# Patient Record
Sex: FEMALE | Race: WHITE
[De-identification: names, ages, dates, MRNs, and addresses within clinical notes are randomized per-mention and may not be internally consistent; named-entity substitution may affect disease eponyms.]

---

## 2020-05-02 ENCOUNTER — HOSPITAL ENCOUNTER (EMERGENCY)
Dept: HOSPITAL 75 - ER | Age: 55
Discharge: ANOTHER HOSPITAL | End: 2020-05-02
Payer: MEDICAID

## 2020-05-02 VITALS — DIASTOLIC BLOOD PRESSURE: 88 MMHG | SYSTOLIC BLOOD PRESSURE: 140 MMHG

## 2020-05-02 VITALS — SYSTOLIC BLOOD PRESSURE: 137 MMHG | DIASTOLIC BLOOD PRESSURE: 79 MMHG

## 2020-05-02 VITALS — SYSTOLIC BLOOD PRESSURE: 137 MMHG | DIASTOLIC BLOOD PRESSURE: 88 MMHG

## 2020-05-02 VITALS — DIASTOLIC BLOOD PRESSURE: 83 MMHG | SYSTOLIC BLOOD PRESSURE: 136 MMHG

## 2020-05-02 VITALS — BODY MASS INDEX: 26.96 KG/M2 | WEIGHT: 159.84 LBS | HEIGHT: 64.57 IN

## 2020-05-02 DIAGNOSIS — M25.541: Primary | ICD-10-CM

## 2020-05-02 DIAGNOSIS — Z98.51: ICD-10-CM

## 2020-05-02 DIAGNOSIS — Z96.652: ICD-10-CM

## 2020-05-02 DIAGNOSIS — F17.200: ICD-10-CM

## 2020-05-02 DIAGNOSIS — R50.9: ICD-10-CM

## 2020-05-02 DIAGNOSIS — Z95.0: ICD-10-CM

## 2020-05-02 DIAGNOSIS — I42.9: ICD-10-CM

## 2020-05-02 DIAGNOSIS — Z88.8: ICD-10-CM

## 2020-05-02 DIAGNOSIS — F15.10: ICD-10-CM

## 2020-05-02 DIAGNOSIS — M25.542: ICD-10-CM

## 2020-05-02 DIAGNOSIS — I11.9: ICD-10-CM

## 2020-05-02 LAB
ALBUMIN SERPL-MCNC: 4.1 GM/DL (ref 3.2–4.5)
ALP SERPL-CCNC: 105 U/L (ref 40–136)
ALT SERPL-CCNC: 19 U/L (ref 0–55)
ANISOCYTOSIS BLD QL SMEAR: SLIGHT
APTT BLD: 36 SEC (ref 24–35)
APTT PPP: YELLOW S
BACTERIA #/AREA URNS HPF: (no result) /HPF
BARBITURATES UR QL: NEGATIVE
BASOPHILS # BLD AUTO: 0 10^3/UL (ref 0–0.1)
BASOPHILS NFR BLD AUTO: 0 % (ref 0–10)
BENZODIAZ UR QL SCN: POSITIVE
BILIRUB SERPL-MCNC: 0.5 MG/DL (ref 0.1–1)
BILIRUB UR QL STRIP: NEGATIVE
BUN/CREAT SERPL: 19
CALCIUM SERPL-MCNC: 9.9 MG/DL (ref 8.5–10.1)
CHLORIDE SERPL-SCNC: 97 MMOL/L (ref 98–107)
CO2 SERPL-SCNC: 19 MMOL/L (ref 21–32)
COCAINE UR QL: NEGATIVE
CREAT SERPL-MCNC: 0.84 MG/DL (ref 0.6–1.3)
EOSINOPHIL # BLD AUTO: 0 10^3/UL (ref 0–0.3)
EOSINOPHIL NFR BLD AUTO: 0 % (ref 0–10)
ERYTHROCYTE [DISTWIDTH] IN BLOOD BY AUTOMATED COUNT: 14 % (ref 10–14.5)
ERYTHROCYTE [SEDIMENTATION RATE] IN BLOOD: 133 MM/HR (ref 0–30)
FIBRINOGEN PPP-MCNC: CLEAR MG/DL
GFR SERPLBLD BASED ON 1.73 SQ M-ARVRAT: > 60 ML/MIN
GLUCOSE SERPL-MCNC: 166 MG/DL (ref 70–105)
GLUCOSE UR STRIP-MCNC: NEGATIVE MG/DL
HCT VFR BLD CALC: 32 % (ref 35–52)
HGB BLD-MCNC: 10.6 G/DL (ref 11.5–16)
INR PPP: 1.1 (ref 0.8–1.4)
KETONES UR QL STRIP: NEGATIVE
LEUKOCYTE ESTERASE UR QL STRIP: (no result)
LYMPHOCYTES # BLD AUTO: 0.8 X 10^3 (ref 1–4)
LYMPHOCYTES NFR BLD AUTO: 6 % (ref 12–44)
MANUAL DIFFERENTIAL PERFORMED BLD QL: YES
MCH RBC QN AUTO: 29 PG (ref 25–34)
MCHC RBC AUTO-ENTMCNC: 33 G/DL (ref 32–36)
MCV RBC AUTO: 89 FL (ref 80–99)
METHADONE UR QL SCN: NEGATIVE
METHAMPHETAMINE SCREEN URINE S: POSITIVE
MONOCYTES # BLD AUTO: 0.9 X 10^3 (ref 0–1)
MONOCYTES NFR BLD AUTO: 7 % (ref 0–12)
MONOCYTES NFR BLD: 5 %
NEUTROPHILS # BLD AUTO: 11.7 X 10^3 (ref 1.8–7.8)
NEUTROPHILS NFR BLD AUTO: 88 % (ref 42–75)
NEUTS BAND NFR BLD MANUAL: 84 %
NEUTS BAND NFR BLD: 5 %
NITRITE UR QL STRIP: NEGATIVE
OPIATES UR QL SCN: POSITIVE
OXYCODONE UR QL: NEGATIVE
PH UR STRIP: 6 [PH] (ref 5–9)
PLATELET # BLD: 235 10^3/UL (ref 130–400)
PMV BLD AUTO: 10.3 FL (ref 7.4–10.4)
POLYCHROMASIA BLD QL SMEAR: SLIGHT
POTASSIUM SERPL-SCNC: 3.9 MMOL/L (ref 3.6–5)
PROPOXYPH UR QL: NEGATIVE
PROT SERPL-MCNC: 7.9 GM/DL (ref 6.4–8.2)
PROT UR QL STRIP: (no result)
PROTHROMBIN TIME: 14.2 SEC (ref 12.2–14.7)
RBC #/AREA URNS HPF: (no result) /HPF
SODIUM SERPL-SCNC: 130 MMOL/L (ref 135–145)
SP GR UR STRIP: 1.01 (ref 1.02–1.02)
TRICYCLICS UR QL SCN: POSITIVE
URATE SERPL-MCNC: 6.2 MG/DL (ref 2.6–7.2)
VARIANT LYMPHS NFR BLD MANUAL: 6 %
WBC # BLD AUTO: 13.4 10^3/UL (ref 4.3–11)
WBC #/AREA URNS HPF: (no result) /HPF

## 2020-05-02 PROCEDURE — 85027 COMPLETE CBC AUTOMATED: CPT

## 2020-05-02 PROCEDURE — 93005 ELECTROCARDIOGRAM TRACING: CPT

## 2020-05-02 PROCEDURE — 87040 BLOOD CULTURE FOR BACTERIA: CPT

## 2020-05-02 PROCEDURE — 86431 RHEUMATOID FACTOR QUANT: CPT

## 2020-05-02 PROCEDURE — 83880 ASSAY OF NATRIURETIC PEPTIDE: CPT

## 2020-05-02 PROCEDURE — 71045 X-RAY EXAM CHEST 1 VIEW: CPT

## 2020-05-02 PROCEDURE — 36415 COLL VENOUS BLD VENIPUNCTURE: CPT

## 2020-05-02 PROCEDURE — 85730 THROMBOPLASTIN TIME PARTIAL: CPT

## 2020-05-02 PROCEDURE — 80306 DRUG TEST PRSMV INSTRMNT: CPT

## 2020-05-02 PROCEDURE — 87088 URINE BACTERIA CULTURE: CPT

## 2020-05-02 PROCEDURE — 80053 COMPREHEN METABOLIC PANEL: CPT

## 2020-05-02 PROCEDURE — 93041 RHYTHM ECG TRACING: CPT

## 2020-05-02 PROCEDURE — 85610 PROTHROMBIN TIME: CPT

## 2020-05-02 PROCEDURE — 51702 INSERT TEMP BLADDER CATH: CPT

## 2020-05-02 PROCEDURE — 87186 SC STD MICRODIL/AGAR DIL: CPT

## 2020-05-02 PROCEDURE — 84145 PROCALCITONIN (PCT): CPT

## 2020-05-02 PROCEDURE — 85007 BL SMEAR W/DIFF WBC COUNT: CPT

## 2020-05-02 PROCEDURE — 86141 C-REACTIVE PROTEIN HS: CPT

## 2020-05-02 PROCEDURE — 83605 ASSAY OF LACTIC ACID: CPT

## 2020-05-02 PROCEDURE — 85652 RBC SED RATE AUTOMATED: CPT

## 2020-05-02 PROCEDURE — 81000 URINALYSIS NONAUTO W/SCOPE: CPT

## 2020-05-02 PROCEDURE — 84550 ASSAY OF BLOOD/URIC ACID: CPT

## 2020-05-02 RX ADMIN — VANCOMYCIN HYDROCHLORIDE SCH MLS/HR: 750 INJECTION, POWDER, LYOPHILIZED, FOR SOLUTION INTRAVENOUS at 06:04

## 2020-05-02 RX ADMIN — VANCOMYCIN HYDROCHLORIDE SCH MLS/HR: 750 INJECTION, POWDER, LYOPHILIZED, FOR SOLUTION INTRAVENOUS at 07:13

## 2020-05-02 NOTE — XMS REPORT
Continuity of Care Document

                             Created on: 2020



Aide Huang

External Reference #: [id number]

: 1965

Sex: Female



Demographics





                          Address                   [address]  [address] [place]  [address]

 

                          Home Phone                [phone] x

 

                          Preferred Language        Unknown

 

                          Marital Status            Unknown

 

                          Anabaptism Affiliation     Unknown

 

                          Race                      Unknown

 

                          Ethnic Group              Unknown





Author





                          Organization              Unknown

 

                          Address                   Unknown

 

                          Phone                     Unavailable



              



Allergies

      



There is no data.                  



Medications

      



There is no data.                  



Problems

      



There is no data.                  



Procedures

      



There is no data.                  



Results

      



                    Test                Result              Range        

 

                                        TSH - 10/30/19 09:44         

 

                    TSH                 2.32 mIU/L           NRG        



                



Encounters

      



                ACCT No.           Visit Date/Time           Discharge          

 Status         

             Pt. Type           Provider           Facility           Loc./Unit 

          

Complaint        

 

                [id number]           2020 11:25:57           2020 23:59:

59           CLS

                Outpatient           SELENA Almanza                           

          

                                                 

 

                [id number]           2019 17:01:30           2019 23:59:

59           CLS

                Outpatient           SELENA Almanza                           

          

                                                 

 

                [id number]           2019 16:53:43           2019 23:59:

59           CLS

                Outpatient           SELENA Almanza                           

          

                                                 

 

                [id number]           2019 12:36:19           2019 23:59:

59           CLS

                Outpatient           SELENA Almanza                           

          

                                                 

 

                [id number]           2018 14:22:43           2018 23:59:

59           CLS

                Outpatient           Greg Schwarz                       

          

                                                 

 

                [id number]           2014 11:15:39           2014 23:59:

59           CLS

                Outpatient           Porsche Amado                     

          

                                                 

 

                [id number]           2014 15:59:46           2014 23:59:

59           CLS

             Outpatient           Saleem Marroquin                                 

     

       

 

                [id number]           2014 10:50:40           2014 23:59:

59           CLS

             Outpatient           Saleem Marroquin                                 

     

       

 

                [id number]           2014 16:04:24           2014 23:59:

59           CLS

             Outpatient           Saleem Marroquin                                 

     

       

 

                [id number]           2014 16:34:18           2014 23:59:

59           CLS

                Outpatient           Geni Fox                            

          

                                                 

 

                [id number]           10/25/2019 08:45:00           10/25/2019 23:59:

59           CLS

                Outpatient           JUDY HUSTON                           

[facility]                                 

 

             [id number]           10/30/2019 08:40:00                              

       Document

Registration

## 2020-05-02 NOTE — XMS REPORT
[facility]

                             Created on: 2017



Aide Huang

External Reference #: [id number]

: 1965

Sex: Female



Demographics





                          Address                   [address]  [address]

 

                          Preferred Language        Unknown

 

                          Marital Status            Unknown

 

                          Anglican Affiliation     Unknown

 

                          Race                      Unknown

 

                          Ethnic Group              Unknown





Author





                          Author                    Aide HUSTON

 

                          Organization              [facility]

 

                          Address                   [address] [place]  [address]



 

                          Phone                     [phone]







Care Team Providers





                    Care Team Member Name Role                Phone

 

                    JUDY HUSTON   Unavailable         [phone]







PROBLEMS





          Type      Condition ICD9-CM Code IEX28-VH Code Onset Dates Condition S

tatus SNOMED 

Code

 

          Problem   Primary osteoarthritis of both knees           M17.0        

       Active    [id number]

 

                Problem         Gastroesophageal reflux disease, esophagitis pre

sence not specified                 

K21.9                                   Active              [id number]

 

          Problem   Essential hypertension           I10                 Active 

   [id number]







ALLERGIES

No Known Allergies



SOCIAL HISTORY

Never Assessed



PLAN OF CARE





                          Activity                  Details

 

                                         

 

                          Follow Up                 1 Week Reason:after stress t

est







VITAL SIGNS





                    Height              65 in               2017

 

                    Weight              162.6 lbs           2017

 

                    Temperature         97.3 degrees Fahrenheit 2017

 

                    Heart Rate          96 bpm              2017

 

                    Respiratory Rate    20                  2017

 

                    Oximetry            99 %                2017

 

                    BMI                 27.06 kg/m2         2017

 

                    Blood pressure systolic 162 mmHg            2017

 

                    Blood pressure diastolic 99 mmHg             2017







MEDICATIONS





        Medication Instructions Dosage  Frequency Start Date End Date Duration S

tatus

 

        Excedrin Migraine 250-250-65 MG Orally Once a day 3 tabs  24h           

                  Active

 

             Metoprolol Tartrate 25 MG Orally Twice a day 1 tablet with food 12h

          

                                        30 day(s)           Active







RESULTS

No Results



PROCEDURES





                Procedure       Date Ordered    Result          Body Site

 

                EKG, TRACING (IN-HOUSE) 2017      N/A              

 

                MEASURE BLOOD OXYGEN LEVEL 2017                     

 

                ELECTROCARDIOGRAM, TRACING 2017                     







IMMUNIZATIONS

No Known Immunizations



MEDICAL (GENERAL) HISTORY





                    Type                Description         Date

 

                    Medical History     hypertension         

 

                    Medical History     Fluid on knees       

 

                    Surgical History    knee surgery Lt X 2  

 

                    Surgical History    tubal ligation       

 

                    Surgical History    lump removed Lt breast  

 

                    Surgical History    ovary removed Lt     

 

                    Surgical History    tonsillectomy and adenoidectomy  

 

                    Surgical History    tubes put in ears    

 

                    Surgical History    colonoscopy          

 

                    Surgical History    heart cath          2017

 

                    Hospitalization History surgeries            

 

                    Hospitalization History child birth

## 2020-05-02 NOTE — NUR
REPORT AND CARE OF PATIENT FROM [facility]. 

-------------------------------------------------------------------------------

Addendum: 05/02/20 at 0737 by KELSEY

-------------------------------------------------------------------------------

TO ROOM NO NEW C/O

## 2020-05-02 NOTE — ED GENERAL
General


Chief Complaint:  General Problems/Pain


Stated Complaint:  BOTH ARMS SWOLLEN,COPD,PACE MAKER


Nursing Triage Note:  


swelling throughout body, states it comes and goes. c/o pain and skin feels 


tight, states numbness and tingling.


Nursing Sepsis Screen:  No Definite Risk


Source of Information:  Patient


Exam Limitations:  No Limitations





History of Present Illness


Date Seen by Provider:  May 2, 2020


Time Seen by Provider:  01:34


Initial Comments


This 55-year-old woman presents to the emergency room with primary complaint of 

bilateral upper extremity swelling and paresthesias.  On assessment she is found

to be febrile which she was not aware of.  She complains of frequent problems 

with swelling and she is prescribed Lasix to take as needed.  She reports 

pacemaker placement one year ago due to cardiomyopathy.  She sees Dr. Almanza 

in [place] for her cardiac care.  Her primary care provider is Judy Jackson 

at the [facility] clinic in [place] her extremities are diffusely swollen, upper 

extremities worse than lower.  She also has significant arthralgia associated 

with this.  She has patchy erythema over the joints of the upper extremities.  

She admits to using methamphetamines by snorting within the past couple days.  

She reports being told by doctors previously she may have some type of arthritic

or rheumatologic problem.  She denies any cough, shortness of breath, exposure 

to ill persons, or contact with persons under investigation for COVID 19.  She 

has chronic incontinence.  Patient also had recent knee replacements, the right 

knee in December and the left knee the beginning of March.  Current exacerbation

of symptoms started .





Allergies and Home Medications


Allergies


Coded Allergies:  


     lisinopril (Verified  Allergy, Unknown, 20)





Patient Home Medication List


Home Medication List Reviewed:  Yes





Review of Systems


Review of Systems


Constitutional:  see HPI


EENTM:  no symptoms reported


Respiratory:  no symptoms reported


Cardiovascular:  see HPI


Gastrointestinal:  no symptoms reported


Genitourinary:  see HPI


Pregnant:  No


Musculoskeletal:  see HPI


Skin:  see HPI


Psychiatric/Neurological:  No Symptoms Reported


Hematologic/Lymphatic:  No Symptoms Reported


Immunological/Allergic:  see HPI





Past Medical-Social-Family Hx


Past Med/Social Hx:  Reviewed Nursing Past Med/Soc Hx


Patient Social History


Alcohol Use:  Denies Use


Recreational Drug Use:  Yes (LAST USED YESTERDAY)


Drug of Choice:  METH


Smoking Status:  Current Everyday Smoker


Recent Foreign Travel:  No


Contact w/Someone Who Travel:  No


Recent Infectious Disease Expo:  No


Recent Hopitalizations:  No


Physical Abuse:  No


Sexual Abuse:  No


Mistreated:  No


Fear:  No





Seasonal Allergies


Seasonal Allergies:  No





Past Medical History


Surgeries:  Yes


Joint Replacement (bilateral knees), Lumpectomy, Orthopedic, Pacemaker, Tubal 

Ligation


Respiratory:  No


Cardiac:  Yes (PACEMAKER)


Cardiomyopathy, Hypertension


Pregnant:  No


Hx :  3


Hx Para:  4


Hx Total # of Abortions (Sp):  1


GYN History:  Tubal Ligation


Genitourinary:  Yes (chronic incontinence)


Gastrointestinal:  No


Musculoskeletal:  Yes (KNEE REPLACEMENT)


Endocrine:  No


HEENT:  No


Cancer:  No


Psychosocial:  No


Integumentary:  No


Blood Disorders:  No





Physical Exam-Suspected Sepsis


Physical Exam


Vital Signs





Vital Signs - First Documented








 20





 05:53


 


O2 Delivery Nasal Cannula


 


O2 Flow Rate 2.00





Capillary Refill : Less Than 3 Seconds








Blood Pressure Mean:                    97








Height, Weight, BMI


Height: '"


Weight: lbs. oz. kg; 26.00 BMI


Method:


General Appearance:  WD/WN, Mild Distress


HEENT:  PERRL/EOMI, TMs Normal, Normal ENT Inspection, Pharynx Normal


Neck:  Normal Inspection


Respiratory:  Lungs Clear, Normal Breath Sounds, No Accessory Muscle Use, No 

Respiratory Distress


Cardiovascular:  No Murmur, Normal Peripheral Pulses, Tachycardia


Gastrointestinal:  Non Tender, Soft


Extremity:  Other (diffusely edematous extremities, upper extremities greater 

than lower and left knee greater than right.  There is patchy erythema over the 

upper extremity joints.  There is diffuse tenderness and pain with movement.  

Nodular lump on the right shoulder resembling lipoma.)


Neurologic/Psychiatric:  Alert, Oriented x3, No Motor/Sensory Deficits, Normal 

Mood/Affect, CNs II-XII Norm as Tested


Skin:  warm/dry, other (see above)





Focused Exam


Lactate Level


20 01:40: Lactic Acid Level 1.98





Lactic Acid Level








Progress/Results/Core Measures


Suspected Sepsis


Recent Fever Within 48 Hours:  No


Infection Criteria Present:  None


New/Unexplained  Altered Menta:  No


Sepsis Screen:  No Definite Risk


SIRS


Temperature: 


Pulse: 115 


Respiratory Rate: 20


 


Laboratory Tests


20 01:40: White Blood Count 13.4H


Blood Pressure 134 /79 


Mean: 97


 


20 01:40: Lactic Acid Level 1.98


Laboratory Tests


20 01:40: 


Creatinine 0.84, INR Comment 1.1, Platelet Count 235, Total Bilirubin 0.5








Results/Orders


Lab Results





Laboratory Tests








Test


 20


01:38 20


01:40 20


02:55 Range/Units


 


 


White Blood Count


 


 13.4 H


 


 4.3-11.0


10^3/uL


 


Red Blood Count


 


 3.63 L


 


 4.35-5.85


10^6/uL


 


Hemoglobin  10.6 L  11.5-16.0  G/DL


 


Hematocrit  32 L  35-52  %


 


Mean Corpuscular Volume  89   80-99  FL


 


Mean Corpuscular Hemoglobin  29   25-34  PG


 


Mean Corpuscular Hemoglobin


Concent 


 33 


 


 32-36  G/DL





 


Red Cell Distribution Width  14.0   10.0-14.5  %


 


Platelet Count


 


 235 


 


 130-400


10^3/uL


 


Mean Platelet Volume  10.3   7.4-10.4  FL


 


Neutrophils (%) (Auto)  88 H  42-75  %


 


Lymphocytes (%) (Auto)  6 L  12-44  %


 


Monocytes (%) (Auto)  7   0-12  %


 


Eosinophils (%) (Auto)  0   0-10  %


 


Basophils (%) (Auto)  0   0-10  %


 


Neutrophils # (Auto)  11.7 H  1.8-7.8  X 10^3


 


Lymphocytes # (Auto)  0.8 L  1.0-4.0  X 10^3


 


Monocytes # (Auto)  0.9   0.0-1.0  X 10^3


 


Eosinophils # (Auto)


 


 0.0 


 


 0.0-0.3


10^3/uL


 


Basophils # (Auto)


 


 0.0 


 


 0.0-0.1


10^3/uL


 


Neutrophils % (Manual)  84    %


 


Lymphocytes % (Manual)  6    %


 


Monocytes % (Manual)  5    %


 


Band Neutrophils  5    %


 


Polychromasia  SLIGHT    


 


Anisocytosis  SLIGHT    


 


Erythrocyte Sedimentation Rate  133 H  0-30  MM/HR


 


Prothrombin Time  14.2   12.2-14.7  SEC


 


INR Comment  1.1   0.8-1.4  


 


Activated Partial


Thromboplast Time 


 36 H


 


 24-35  SEC





 


Sodium Level  130 L  135-145  MMOL/L


 


Potassium Level  3.9   3.6-5.0  MMOL/L


 


Chloride Level  97 L    MMOL/L


 


Carbon Dioxide Level  19 L  21-32  MMOL/L


 


Anion Gap  14   5-14  MMOL/L


 


Blood Urea Nitrogen  16   7-18  MG/DL


 


Creatinine


 


 0.84 


 


 0.60-1.30


MG/DL


 


Estimat Glomerular Filtration


Rate 


 > 60 


 


  





 


BUN/Creatinine Ratio  19    


 


Glucose Level  166 H    MG/DL


 


Lactic Acid Level


 


 1.98 


 


 0.50-2.00


MMOL/L


 


Uric Acid  6.2   2.6-7.2  MG/DL


 


Calcium Level  9.9   8.5-10.1  MG/DL


 


Corrected Calcium  9.8   8.5-10.1  MG/DL


 


Total Bilirubin  0.5   0.1-1.0  MG/DL


 


Aspartate Amino Transf


(AST/SGOT) 


 18 


 


 5-34  U/L





 


Alanine Aminotransferase


(ALT/SGPT) 


 19 


 


 0-55  U/L





 


Alkaline Phosphatase  105     U/L


 


C-Reactive Protein High


Sensitivity 


 32.98 H


 


 0.00-0.50


MG/DL


 


B-Type Natriuretic Peptide  42.4   <100.0  PG/ML


 


Total Protein  7.9   6.4-8.2  GM/DL


 


Albumin  4.1   3.2-4.5  GM/DL


 


Procalcitonin  0.14 H  <0.10  NG/ML


 


Urine Color   YELLOW   


 


Urine Clarity   CLEAR   


 


Urine pH   6.0  5-9  


 


Urine Specific Gravity   1.015 L 1.016-1.022  


 


Urine Protein   TRACE H NEGATIVE  


 


Urine Glucose (UA)   NEGATIVE  NEGATIVE  


 


Urine Ketones   NEGATIVE  NEGATIVE  


 


Urine Nitrite   NEGATIVE  NEGATIVE  


 


Urine Bilirubin   NEGATIVE  NEGATIVE  


 


Urine Urobilinogen   0.2  < = 1.0  MG/DL


 


Urine Leukocyte Esterase   1+ H NEGATIVE  


 


Urine RBC (Auto)   2+ H NEGATIVE  


 


Urine RBC      /HPF


 


Urine WBC      /HPF


 


Urine Crystals      /LPF


 


Urine Bacteria      /HPF


 


Urine Casts      /LPF


 


Urine Mucus      /LPF


 


Urine Culture Indicated


 


 


 CULTURE


PENDING  





 


Urine Opiates Screen   POSITIVE H NEGATIVE  


 


Urine Oxycodone Screen   NEGATIVE  NEGATIVE  


 


Urine Methadone Screen   NEGATIVE  NEGATIVE  


 


Urine Propoxyphene Screen   NEGATIVE  NEGATIVE  


 


Urine Barbiturates Screen   NEGATIVE  NEGATIVE  


 


Ur Tricyclic Antidepressants


Screen 


 


 POSITIVE H


 NEGATIVE  





 


Urine Phencyclidine Screen   NEGATIVE  NEGATIVE  


 


Urine Amphetamines Screen   POSITIVE H NEGATIVE  


 


Urine Methamphetamines Screen   POSITIVE H NEGATIVE  


 


Urine Benzodiazepines Screen   POSITIVE H NEGATIVE  


 


Urine Cocaine Screen   NEGATIVE  NEGATIVE  


 


Urine Cannabinoids Screen   NEGATIVE  NEGATIVE  








Micro Results





Microbiology


20 Urine Culture - Preliminary, Resulted


         Escherichia coli


20 Blood Culture - Preliminary, Resulted


         No growth


20 Blood Culture - Preliminary, Resulted


         No growth





My Orders





Orders - PAULINE KELLER MD


Cbc With Automated Diff (20 01:42)


Comprehensive Metabolic Panel (20 01:42)


Blood Culture (20 01:42)


Urinalysis (20 01:42)


Urine Culture (20 01:42)


Protime With Inr (20 01:42)


Partial Thromboplastin Time (20 01:42)


Chest 1 View, Ap/Pa Only (20 01:42)


Ed Iv/Invasive Line Start (20 01:42)


Ed Iv/Invasive Line Start (20 01:42)


Vital Signs Adult Sepsis Patie Q15M (20 01:42)


O2 (20 01:42)


Remove Rings In Anticipation O (20 01:42)


Lactic Acid Analyzer (20 01:42)


Hs C Reactive Protein (20 01:42)


Erythrocyte Sedimentation Rate (20 01:42)


BNP (20 01:42)


Ekg Tracing (20 01:42)


Monitor-Rhythm Ecg Trace Only (20 01:42)


Drug Screen Stat (Urine) (20 01:47)


Manual Differential (20 01:40)


Uric Acid (20 01:40)


Ra Factor (Rheumatoid Factor) (20 02:43)


Procalcitonin (Pct) (20 02:46)


Acetaminophen  Tablet (Tylenol  Tablet) (20 03:00)


Lactated Ringers (Lr 1000 Ml Iv Solution (20 02:47)


Piperacillin Sodium/Tazobactam (Zosyn Vi (20 03:15)


Fentanyl  Injection (Sublimaze Injection (20 03:30)


Ketorolac Injection (Toradol Injection) (20 04:15)


Morphine  Injection (Morphine  Injection (20 04:22)


Vancomycin Injection (Vancomycin Injecti (20 05:30)





Medications Given in ED





Vital Signs/I&O











 20





 07:23 09:35


 


Pulse 103 90


 


Resp 18 18


 


B/P (MAP) 137/88 (104) 136/83


 


Pulse Ox 99 98


 


O2 Delivery Room Air Nasal Cannula


 


O2 Flow Rate  2.00





Capillary Refill : Less Than 3 Seconds








Blood Pressure Mean:                    97








Progress Note #1:  


   Time:  03:37


Progress Note


Septic workup is being pursued.  Blood cultures and lactic acid were obtained.  

Lactic acid was normal.  Sedimentation rate and CRP are markedly elevated.  

Given her clinical presentation and lab findings, I'm suspicious this may be a 

systemic rheumatologic problem or vasculitis.  No source of bacterial infection 

was found.  Zosyn was given empirically.


Progress Note #2:  


   Time:  03:59


Progress Note


Patient's pain had been escalating.  She received fentanyl which reduced her 

pain from 10 down to 6.  Toradol is being ordered for further pain control.  

Case was discussed with Dr. Kruger who is agreeable to admission.  However, she 

would like me to obtain a phone consultation with rheumatology before admission.

 Neither [facility] nor [facility] in [place] have rheumatology on call.  I'm awaiting a 

call back from [facility].


Progress Note #3:  


   Time:  05:36


Progress Note


I was able to consult with Dr. Bailey with [facility] rheumatology.  It is difficult for 

him to make a determination on etiology of her condition without actually 

examining the patient himself.  He of course stressed the importance of ruling 

out infectious etiologies.  Patient has received Zosyn empirically and will 

receive vancomycin.  However, I think infection is less likely than a 

rheumatologic problem.  She does not appear septic.  Patient reports she has had

an episode like this previously for which she sought care in the emergency room 

in [place] and was told she likely had arthritis.  Meningitis is unlikely as 

headache or nuchal rigidity is not presently a part of the presentation.  

Patient is also not lethargic.  She has other evidence of long-standing 

arthritis including need for knee replacements and changes to the joints of the 

hands.  Patient also reports her sister has rheumatoid arthritis.  She also has 

history of cardiomyopathy without notation of coronary artery disease.  Dr. Bailey 

recommended further workup with YOSEF, CCP, and x-rays of the joints.  I will 

allow those studies to be done at [facility] since I would not have immediate results 

in the ER anyway.  Patient's pain is now controlled after morphine.  Patient is 

agreeable to transfer to [facility].  Accepting physician is Dr. Cool on the 

internal medicine service.





ECG


Initial ECG Impression Date:  May 2, 2020


Initial ECG Impression Time:  01:41


Initial ECG Rate:  114


Comment


Atrial sensed ventricular paced rhythm with mild tachycardia.





Diagnostic Imaging





   Diagonstic Imaging:  Xray


   Plain Films/CT/US/NM/MRI:  chest


Comments


Chest x-ray viewed by me.  Report not yet available.  No acute abnormality 

appreciated.





Departure


Impression





   Primary Impression:  


   Polyarthralgia


   Additional Impressions:  


   Febrile illness


   Methamphetamine abuse


Disposition:   XFER SHT-TRM HOSP


Condition:  Improved





Transfer


Transfer Reason:  Exceeds level of care


Time Spoke to Accepting Phy:  05:07


Transfer Progress Notes


Accepting physician Dr. Cool


Transfer Time:  09:30


Transfer Facility:  


[facility]


Method of Transfer:  EMS





Departure-Patient Inst.


Referrals:  


JUDY HUSTON (PCP/Family)


Primary Care Physician











PAULINE KELLER MD         May 2, 2020 03:23

## 2020-05-02 NOTE — XMS REPORT
[facility]

                             Created on: 10/04/2017



Aide Huang

External Reference #: [id number]

: 1965

Sex: Female



Demographics





                          Address                   [address]

[place]  [address]

 

                          Preferred Language        Unknown

 

                          Marital Status            Unknown

 

                          Episcopal Affiliation     Unknown

 

                          Race                      Unknown

 

                          Ethnic Group              Unknown





Author





                          Author                    Aide HUSTON

 

                          Organization              [facility]

 

                          Address                   [address] [place]  [address]



 

                          Phone                     [phone]







Care Team Providers





                    Care Team Member Name Role                Phone

 

                    JUDY HUSTON   Unavailable         [phone]







PROBLEMS





          Type      Condition ICD9-CM Code WYB37-HN Code Onset Dates Condition S

tatus SNOMED 

Code

 

          Problem   Primary osteoarthritis of both knees           M17.0        

       Active    346557521

 

                Problem         Gastroesophageal reflux disease, esophagitis pre

sence not specified                 

K21.9                                   Active              [id number]

 

          Problem   Essential hypertension           I10                 Active 

   [id number]







ALLERGIES

No Information



SOCIAL HISTORY

Never Assessed



PLAN OF CARE





VITAL SIGNS





MEDICATIONS

No Known Medications



RESULTS





                Name            Result          Date            Reference Range

 

                CBC                             2017       

 

                WBC             6.5                             3.4-10.8

 

                RBC             4.55                            3.77-5.28

 

                Hemoglobin      12.6                            11.1-15.9

 

                Hematocrit      39.4                            34.0-46.6

 

                MCV             87                              79-97

 

                MCH             27.7                            26.6-33.0

 

                MCHC            32.0                            31.5-35.7

 

                RDW             15.6                            12.3-15.4

 

                Platelets       288                             150-379

 

                Neutrophils     63                               

 

                Lymphs          28                               

 

                Monocytes       7                                

 

                Eos             2                                

 

                Basos           0                                

 

                Immature Cells                                   

 

                Neutrophils (Absolute) 4.1                             1.4-7.0

 

                Lymphs (Absolute) 1.8                             0.7-3.1

 

                Monocytes(Absolute) 0.5                             0.1-0.9

 

                Eos (Absolute)  0.1                             0.0-0.4

 

                Baso (Absolute) 0.0                             0.0-0.2

 

                Immature Granulocytes 0                                

 

                Immature Grans (Abs) 0.0                             0.0-0.1

 

                NRBC                                             

 

                Hematology Comments:                                  







PROCEDURES





                Procedure       Date Ordered    Result          Body Site

 

                ROUTINE VENIPUNCTURE 2017      N/A              

 

                COMPLETE CBC W/AUTO DIFF WBC 2017                   







IMMUNIZATIONS

No Known Immunizations



MEDICAL (GENERAL) HISTORY





                    Type                Description         Date

 

                    Medical History     hypertension         

 

                    Medical History     Fluid on knees       

 

                    Surgical History    knee surgery Lt X 2  

 

                    Surgical History    tubal ligation       

 

                    Surgical History    lump removed Lt breast  

 

                    Surgical History    ovary removed Lt     

 

                    Surgical History    tonsillectomy and adenoidectomy  

 

                    Surgical History    tubes put in ears    

 

                    Surgical History    colonoscopy          

 

                    Surgical History    heart cath          2017

 

                    Hospitalization History surgeries            

 

                    Hospitalization History child birth

## 2020-05-02 NOTE — XMS REPORT
[facility]

                             Created on: 2017



Aide Huang

External Reference #: [id number]

: 1965

Sex: Female



Demographics





                          Address                   [id number]  [address]

 

                          Preferred Language        Unknown

 

                          Marital Status            Unknown

 

                          Moravian Affiliation     Unknown

 

                          Race                      Unknown

 

                          Ethnic Group              Unknown





Author





                          Author                    Aide HUSTON

 

                          Organization              [facility]

 

                          Address                   [address] [place]  [address]



 

                          Phone                     [phone]







Care Team Providers





                    Care Team Member Name Role                Phone

 

                    JUDY HUSTON   Unavailable         [phone]







PROBLEMS





          Type      Condition ICD9-CM Code YCM77-MP Code Onset Dates Condition S

tatus SNOMED 

Code

 

          Problem   Primary osteoarthritis of both knees           M17.0        

       Active    [id number]

 

                Problem         Gastroesophageal reflux disease, esophagitis pre

sence not specified                 

K21.9                                   Active              016394717

 

          Problem   Essential hypertension           I10                 Active 

   07975641







ALLERGIES

No Known Allergies



SOCIAL HISTORY

Never Assessed



PLAN OF CARE





                          Activity                  Details

 

                                         

 

                          Follow Up                 2 Weeks Reason:bp recheck







VITAL SIGNS





                    Height              65 in               2017

 

                    Weight              166.2 lbs           2017

 

                    Temperature         97.5 degrees Fahrenheit 2017

 

                    Heart Rate          88 bpm              2017

 

                    Respiratory Rate    20                  2017

 

                    BMI                 27.65 kg/m2         2017

 

                    Blood pressure systolic 152 mmHg            2017

 

                    Blood pressure diastolic 95 mmHg             2017







MEDICATIONS





        Medication Instructions Dosage  Frequency Start Date End Date Duration S

tatus

 

                          Symbicort 160-4.5 MCG/ACT Inhalation Twice a day- rins

e mouth and spit after use

           2 puffs                                      Active

 

        Excedrin Migraine 250-250-65 MG Orally Once a day 3 tabs  24h           

                  Active

 

        Amlodipine Besylate 5 mg Orally Once a day in AM 1 tablet                          

Active

 

          Metoprolol Tartrate 25 MG Orally Twice a day 1 tablet with food 12h   

                        30 

day(s)                                  Active

 

             Combivent Respimat  MCG/ACT Inhalation Four times a day 1 puf

f       6h                                                                   Active







RESULTS





                Name            Result          Date            Reference Range

 

                Lexiscan Stress Nuclear Test                                  







PROCEDURES





                Procedure       Date Ordered    Result          Body Site

 

                PULMONARY FUNCTION TEST 2017      N/A              







IMMUNIZATIONS

No Known Immunizations



MEDICAL (GENERAL) HISTORY





                    Type                Description         Date

 

                    Medical History     hypertension         

 

                    Medical History     Fluid on knees       

 

                    Surgical History    knee surgery Lt X 2  

 

                    Surgical History    tubal ligation       

 

                    Surgical History    lump removed Lt breast  

 

                    Surgical History    ovary removed Lt     

 

                    Surgical History    tonsillectomy and adenoidectomy  

 

                    Surgical History    tubes put in ears    

 

                    Surgical History    colonoscopy          

 

                    Surgical History    heart cath          2017

 

                    Hospitalization History surgeries            

 

                    Hospitalization History child birth
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
## 2020-05-02 NOTE — XMS REPORT
Patient Summary (HL7 CCD)

                             Created on: 2014



GEOFFREY CHANEY

External Reference #: [id number]

: 1965

Sex: Female



Demographics





                          Address                   [address]

[place]  [address]

 

                          Home Phone                [phone]

 

                          Preferred Language        English

 

                          Marital Status            Unknown

 

                          Shinto Affiliation     Unknown

 

                          Race                      White

 

                          Ethnic Group              Not  or 





Author





                          Author                    GEOFFREY ROBBINS

 

                          Organization              Unknown

 

                          Address                   [address]

[place], [place]  [id number]



 

                          Phone                     [phone]







Care Team Providers





                    Care Team Member Name Role                Phone

 

                    LULU BERKOWITZ DO  Attphys             [phone]

 

                    HANDSHY JAMESON RODRIGUEZ MD [facility]             [phone]



                                                      



Vital Signs

          



                    Vital Sign          Value               Unit    

 

                    Weight Measured       159                 lbs    

 

                    Height              64                  in    

 

                    BMI (Body Mass Index)       27.29               kg/m^2    

 

                    BSA (Body Surface Area)       1.8                 m^2    

 

                    BP Systolic         120                 mmHg    

 

                    BP Diastolic        70                  mmHg    

 

                    BP Systolic         103                 mmHg    

 

                    BP Diastolic        52                  mmHg    

 

                    BP Systolic         112                 mmHg    

 

                    BP Diastolic        63                  mmHg    

 

                    BP Systolic         117                 mmHg    

 

                    BP Diastolic        72                  mmHg    

 

                    BP Systolic         114                 mmHg    

 

                    BP Diastolic        73                  mmHg    

 

                    BP Systolic         105                 mmHg    

 

                    BP Diastolic        62                  mmHg    

 

                    BP Systolic         111                 mmHg    

 

                    BP Diastolic        70                  mmHg    

 

                    BP Systolic         101                 mmHg    

 

                    BP Diastolic        53                  mmHg    

 

                    BP Systolic         121                 mmHg    

 

                    BP Diastolic        67                  mmHg    

 

                    BP Systolic         107                 mmHg    

 

                    BP Diastolic        60                  mmHg    

 

                    BP Systolic         107                 mmHg    

 

                    BP Diastolic        64                  mmHg    

 

                    BP Systolic         124                 mmHg    

 

                    BP Diastolic        52                  mmHg    

 

                    BP Systolic         109                 mmHg    

 

                    BP Diastolic        71                  mmHg    

 

                    Respiratory Rate       20                  bpm    

 

                    Respiratory Rate       20                  bpm    

 

                    Respiratory Rate       20                  bpm    

 

                    Respiratory Rate       16                  bpm    

 

                    Respiratory Rate       20                  bpm    

 

                    Respiratory Rate       20                  bpm    

 

                    Respiratory Rate       20                  bpm    

 

                    Respiratory Rate       20                  bpm    

 

                    Respiratory Rate       20                  bpm    

 

                    Respiratory Rate       23                  bpm    

 

                    Respiratory Rate       20                  bpm    

 

                    Respiratory Rate       18                  bpm    

 

                    Respiratory Rate       18                  bpm    

 

                    Heart Rate          106                 bpm    

 

                    Heart Rate          107                 bpm    

 

                    Heart Rate          87                  bpm    

 

                    Heart Rate          104                 bpm    

 

                    Heart Rate          98                  bpm    

 

                    Heart Rate          119                 bpm    

 

                    Heart Rate          105                 bpm    

 

                    Heart Rate          100                 bpm    

 

                    Heart Rate          94                  bpm    

 

                    Heart Rate          100                 bpm    

 

                    Heart Rate          75                  bpm    

 

                    Heart Rate          87                  bpm    

 

                    Heart Rate          80                  bpm    

 

                    O2 % BldC Oximetry       98                  %    

 

                    O2 % BldC Oximetry       97                  %    

 

                    O2 % BldC Oximetry       100                 %    

 

                    O2 % BldC Oximetry       100                 %    

 

                    O2 % BldC Oximetry       96                  %    

 

                    O2 % BldC Oximetry       97                  %    

 

                    O2 % BldC Oximetry       100                 %    

 

                    O2 % BldC Oximetry       99                  %    

 

                    O2 % BldC Oximetry       100                 %    

 

                    O2 % BldC Oximetry       99                  %    

 

                    O2 % BldC Oximetry       98                  %    

 

                    O2 % BldC Oximetry       98                  %    

 

                    Body Temperature       99.1                degrees    

 

                    Body Temperature       98.3                degrees    

 

                    Body Temperature       101                 degrees    

 

                    Body Temperature       99.7                degrees    

 

                    Body Temperature       99.9                degrees    

 

                    Body Temperature       100                 degrees    

 

                    Body Temperature       99.5                degrees    

 

                    Body Temperature       98.9                degrees    

 

                    Body Temperature       101                 degrees    

 

                    Body Temperature       100.7               degrees    

 

                    Body Temperature       98.6                degrees    

 

                    Body Temperature       97.5                degrees    

 

                    Body Temperature       97.5                degrees    



                                                                                
                                                                             



Allergies

          



             Allergy       Code         Allergy Type       Reaction       Status

    

 

             No Known Drug Allergies       0            No known drug allergies 

                   

Active    



                                                                              



Procedures

          Unknown.                                                              
               



History of Immunizations

          Unknown.                                                              
               



Problems

          



             Problem       Code         Start Date       Resolved Date       Sta

tus    

 

             Diverticulitis       [id number]                                 Acti

ve    



                                                                                
       



Results

          





                                        CBC W/ AUTO DIFF (RFLX MAN DIFF IF IND) 

    

 

             Test Name       Code         Test Result       Test Units       Gaye

t Date/Time    

 

             WBC          85446-0       7.1000       TH/CMM       2014 06:

15    

 

             RBC          789-8        3.5100       ML/CMM       2014 06:1

5    

 

             HGB          718-7        10.7000       G/DL         2014 06:

15    

 

             HCT          4544-3       32.0000       %            2014 06:

15    

 

             MCV                       91.0000       FL           2014 06:

15    

 

             MCH                       30.5000       PG           2014 06:

15    

 

             MCHC                      33.4000       G/DL         2014 06:

15    

 

             RDW SD                    42.0000       FL           2014 06:

15    

 

             RDW CV                    12.6000       %            2014 06:

15    

 

             MPV                       10.9000       FL           2014 06:

15    

 

             PLT          777-3        208.0000       TH/CMM       2014 06

:15    

 

             NRBC#                     0.0000       TH/CMM       2014 06:1

5    

 

             NRBC%                     0.0000       /100WBC       2014 06:

15    

 

             %NEUT                     75.0000       %            2014 06:

15    

 

             %LYMP                     14.8000       %            2014 06:

15    

 

             %MONO                     9.1000       %            2014 06:1

5    

 

             %EOS                      1.0000       %            2014 06:1

5    

 

             %BASO                     0.1000       %            2014 06:1

5    

 

             #NEUT                     5.2900       TH/CMM       2014 06:1

5    

 

             #LYMP                     1.0400       TH/CMM       2014 06:1

5    

 

             #MONO                     0.6400       TH/CMM       2014 06:1

5    

 

             #EOS                      0.0700       TH/CMM       2014 06:1

5    

 

             #BASO                     0.0100       TH/CMM       2014 06:1

5    

 

             MANUAL DIFF                    NOT IND       N/A          

4 06:15    









                                        CBC W/ AUTO DIFF (RFLX MAN DIFF IF IND) 

    

 

             Test Name       Code         Test Result       Test Units       Gaye

t Date/Time    

 

             WBC          83244-9       13.3000       TH/CMM       2014 20

:55    

 

             RBC          789-8        4.4200       ML/CMM       2014 20:5

5    

 

             HGB          718-7        13.4000       G/DL         2014 20:

55    

 

             HCT          4544-3       39.6000       %            2014 20:

55    

 

             MCV                       90.0000       FL           2014 20:

55    

 

             MCH                       30.3000       PG           2014 20:

55    

 

             MCHC                      33.8000       G/DL         2014 20:

55    

 

             RDW SD                    40.0000       FL           2014 20:

55    

 

             RDW CV                    12.5000       %            2014 20:

55    

 

             MPV                       10.5000       FL           2014 20:

55    

 

             PLT          777-3        229.0000       TH/CMM       2014 20

:55    

 

             NRBC#                     0.0000       TH/CMM       2014 20:5

5    

 

             NRBC%                     0.0000       /100WBC       2014 20:

55    

 

             %NEUT                     84.8000       %            2014 20:

55    

 

             %LYMP                     9.4000       %            2014 20:5

5    

 

             %MONO                     5.3000       %            2014 20:5

5    

 

             %EOS                      0.3000       %            2014 20:5

5    

 

             %BASO                     0.2000       %            2014 20:5

5    

 

             #NEUT                     11.2500       TH/CMM       2014 20:

55    

 

             #LYMP                     1.2400       TH/CMM       2014 20:5

5    

 

             #MONO                     0.7000       TH/CMM       2014 20:5

5    

 

             #EOS                      0.0400       TH/CMM       2014 20:5

5    

 

             #BASO                     0.0200       TH/CMM       2014 20:5

5    

 

             SEGS                      93.0000       %            2014 20:

55    

 

             LYMPHS                    6.0000       %            2014 20:5

5    

 

             MONOS                     1.0000       %            2014 20:5

5    

 

             MANUAL DIFF                    SEE BELOW       N/A          

014 20:55    









                                        COMPREHENSIVE METABOLIC PANEL     

 

             Test Name       Code         Test Result       Test Units       Gaye

t Date/Time    

 

             GLUCOSE       2345-7       92.0000       MG/DL        2014 06

:15    

 

             SODIUM       2951-2       135.0000       MEQ/L        2014 06

:15    

 

             POTASSIUM       2823-3       3.6000       MEQ/L        2014 0

6:15    

 

             CHLORIDE       2075-0       104.0000       MEQ/L        2014 

06:15    

 

             CO2          2028-9       21.0000       MEQ/L        2014 06:

15    

 

             BUN          3094-0       8.0000       MG/DL        2014 06:1

5    

 

             CREATININE       2160-0       0.6000       MG/DL        2014 

06:15    

 

             SGOT/AST       1920-8       11.0000       IU/L         2014 0

6:15    

 

             SGPT/ALT       1742-6       14.0000       IU/L         2014 0

6:15    

 

             ALK PHOS       6768-6       68.0000       IU/L         2014 0

6:15    

 

             TOTAL PROTEIN       2885-2       6.4000       G/DL         20

14 06:15    

 

             ALBUMIN       1751-7       3.3000       G/DL         2014 06:

15    

 

             TOTAL BILI       1975-2       0.4000       MG/DL        2014 

06:15    

 

             CALCIUM       96057-5       8.3000       MG/DL        2014 06

:15    

 

             [age]                       47.0000       [age]          2014 06:

15    

 

             GFR NonAA                    107.0000                    2014

 06:15    

 

             GFR AA                    130.0000                    2014 06

:15    

 

             eGFR                      60.0000       mL/min/1.7       2014

 06:15    

 

             eGFR AA*                    60.0000       mL/min/1.7       20

14 06:15    









                                        COMPREHENSIVE METABOLIC PANEL     

 

             Test Name       Code         Test Result       Test Units       Gaye

t Date/Time    

 

             GLUCOSE       2345-7       161.0000       MG/DL        2014 2

0:55    

 

             SODIUM       2951-2       134.0000       MEQ/L        2014 20

:55    

 

             POTASSIUM       2823-3       3.7000       MEQ/L        2014 2

0:55    

 

             CHLORIDE       2075-0       104.0000       MEQ/L        2014 

20:55    

 

             CO2          2028-9       18.0000       MEQ/L        2014 20:

55    

 

             BUN          3094-0       11.0000       MG/DL        2014 20:

55    

 

             CREATININE       2160-0       0.7000       MG/DL        2014 

20:55    

 

             SGOT/AST       1920-8       14.0000       IU/L         2014 2

0:55    

 

             SGPT/ALT       1742-6       20.0000       IU/L         2014 2

0:55    

 

             ALK PHOS       6768-6       82.0000       IU/L         2014 2

0:55    

 

             TOTAL PROTEIN       2885-2       7.4000       G/DL         20

14 20:55    

 

             ALBUMIN       1751-7       4.2000       G/DL         2014 20:

55    

 

             TOTAL BILI       1975-2       0.5000       MG/DL        2014 

20:55    

 

             CALCIUM       23896-9       10.0000       MG/DL        2014 2

0:55    

 

             [age]                       48.0000       [age]          2014 20:

55    

 

             GFR NonAA                    90.0000                    2014 

20:55    

 

             GFR AA                    109.0000                    2014 20

:55    

 

             eGFR                      60.0000       mL/min/1.7       2014

 20:55    

 

             eGFR AA*                    60.0000       mL/min/1.7       20

14 20:55    









                                        URINALYSIS C&S IF IND     

 

             Test Name       Code         Test Result       Test Units       Gaye

t Date/Time    

 

             COLOR                     YELLOW       N/A          2014 20:5

5    

 

             APPEARANCE                    CLEAR        N/A          2014 

20:55    

 

             SPEC GRAV                    >=1.030       N/A          2014 

20:55    

 

             pH                        6.0          N/A          2014 20:5

5    

 

             PROTEIN                    NEGATIVE       N/A          2014 2

0:55    

 

             GLUCOSE                    NEGATIVE       N/A          2014 2

0:55    

 

             KETONE                    NEGATIVE       N/A          2014 20

:55    

 

             BILIRUBIN                    NEGATIVE       N/A          2014

 20:55    

 

             BLOOD                     NEGATIVE       N/A          2014 20

:55    

 

             NITRITE                    NEGATIVE       N/A          2014 2

0:55    

 

             LEUK SCREEN                    NEGATIVE       N/A          20

14 20:55    

 

             WBC/HPF                    0-5          N/A          2014 20:

55    

 

             RBC/HPF                    0-5          N/A          2014 20:

55    

 

             CASTS/LPF                    NEGATIVE       N/A          2014

 20:55    

 

             CRYSTALS                    NEGATIVE       N/A          2014 

20:55    

 

             MUCOUS THRDS                    NEGATIVE       N/A          

014 20:55    

 

             BACTERIA                    FEW          N/A          2014 20

:55    

 

             EPITH CELLS                    1+ SQUAMOUS       N/A           20:55    

 

             TRICHOMONAS                    NEGATIVE       N/A          20

14 20:55    

 

             YEAST                     NEGATIVE       N/A          2014 20

:55    

 

             CULT SET UP?                    NO           N/A          

4 20:55    









                                        COMPREHENSIVE METABOLIC PANEL     

 

             Test Name       Code         Test Result       Test Units       Gaye

t Date/Time    

 

             GLUCOSE       2345-7       102.0000       MG/DL        2014 0

6:35    

 

             SODIUM       2951-2       134.0000       MEQ/L        2014 06

:35    

 

             POTASSIUM       2823-3       3.5000       MEQ/L        2014 0

6:35    

 

             CHLORIDE       2075-0       102.0000       MEQ/L        2014 

06:35    

 

             CO2          2028-9       21.0000       MEQ/L        2014 06:

35    

 

             BUN          3094-0       9.0000       MG/DL        2014 06:3

5    

 

             CREATININE       2160-0       0.7000       MG/DL        2014 

06:35    

 

             SGOT/AST       1920-8       15.0000       IU/L         2014 0

6:35    

 

             SGPT/ALT       1742-6       19.0000       IU/L         2014 0

6:35    

 

             ALK PHOS       6768-6       79.0000       IU/L         2014 0

6:35    

 

             TOTAL PROTEIN       2885-2       7.3000       G/DL         20

14 06:35    

 

             ALBUMIN       1751-7       3.9000       G/DL         2014 06:

35    

 

             TOTAL BILI       1975-2       0.7000       MG/DL        2014 

06:35    

 

             CALCIUM       28628-4       9.0000       MG/DL        2014 06

:35    

 

             [age]                       48.0000       [age]          2014 06:

35    

 

             GFR NonAA                    90.0000                    2014 

06:35    

 

             GFR AA                    109.0000                    2014 06

:35    

 

             eGFR                      60.0000       mL/min/1.7       2014

 06:35    

 

             eGFR AA*                    60.0000       mL/min/1.7       20

14 06:35    









                                        SED RATE     

 

             Test Name       Code         Test Result       Test Units       Gaye

t Date/Time    

 

             SEDRATE       4537-7       35.0000       MM/HR        2014 20

:55    









                                        CBC W/ AUTO DIFF (RFLX MAN DIFF IF IND) 

    

 

             Test Name       Code         Test Result       Test Units       Gaye

t Date/Time    

 

             WBC          39377-2       11.4000       TH/CMM       2014 06

:35    

 

             RBC          789-8        4.1200       ML/CMM       2014 06:3

5    

 

             HGB          718-7        12.4000       G/DL         2014 06:

35    

 

             HCT          4544-3       36.9000       %            2014 06:

35    

 

             MCV                       90.0000       FL           2014 06:

35    

 

             MCH                       30.1000       PG           2014 06:

35    

 

             MCHC                      33.6000       G/DL         2014 06:

35    

 

             RDW SD                    41.0000       FL           2014 06:

35    

 

             RDW CV                    12.6000       %            2014 06:

35    

 

             MPV                       11.1000       FL           2014 06:

35    

 

             PLT          777-3        230.0000       TH/CMM       2014 06

:35    

 

             NRBC#                     0.0000       TH/CMM       2014 06:3

5    

 

             NRBC%                     0.0000       /100WBC       2014 06:

35    

 

             %NEUT                     81.9000       %            2014 06:

35    

 

             %LYMP                     9.0000       %            2014 06:3

5    

 

             %MONO                     8.5000       %            2014 06:3

5    

 

             %EOS                      0.4000       %            2014 06:3

5    

 

             %BASO                     0.2000       %            2014 06:3

5    

 

             #NEUT                     9.3200       TH/CMM       2014 06:3

5    

 

             #LYMP                     1.0200       TH/CMM       2014 06:3

5    

 

             #MONO                     0.9700       TH/CMM       2014 06:3

5    

 

             #EOS                      0.0400       TH/CMM       2014 06:3

5    

 

             #BASO                     0.0200       TH/CMM       2014 06:3

5    

 

             MANUAL DIFF                    PENDING       N/A           06:35    









                                        AMYLASE     

 

             Test Name       Code         Test Result       Test Units       Gaye

t Date/Time    

 

             AMYLASE       1798-8       15.0000       IU/L         2014 20

:55    









                                        LIPASE     

 

             Test Name       Code         Test Result       Test Units       Gaye

t Date/Time    

 

             LIPASE       3040-3       9.0000       U/L          2014 20:5

5    



                                                                                
                                                                                
      



Medications

          



          Medication       Code       Dose       Units       Frequency       Rou

te       

Modification              Start Date/Time           Stop Date/Time    

 

             ONDANSETRON [ZOFRAN] INJ 4 MG/2 ML VIAL       [id number]       4       

     MG           PRN 

                SIVP                            2014 23:24            

 

             PANTOPRAZOLE [PROTONIX] INJ VIAL: 40 MG       [id number]       40      

     MG           

DAILY           SIVP                            2014 23:24            

 

             METRONIDazole [FLAGYL] IV BAmg       [id number]                  

               Q8H     

                IVPB                            2014 21:00       

4 21:00    

 

             ~~ METRONIDazole [FLAGYL] IV BAmg       270293       500     

     MG             

                                                                     

 

           ~~ DO NOT REFRIGERATE!!!!!       [id number]       1          EA     

                   

                                                                 

 

           FENTANYL AMP :50 mcg/ml 2ML AMP       290358       25         MCG    

    PRN        

IVP                                     2014 23:25            

 

             LEVOFLOXACIN [LEVAQUIN] IV BAMG       [id number]                 

                Q24H   

                IVPB                            2014 23:24       

4 23:24    

 

             ~~ LEVOFLOXACIN [LEVAQUIN] IV BAMG       930518       750    

      MG            

                                                                     

 

             ACETAMINOPHEN [TYLENOL] TABS 325MG       [id number]       650          

MG           PRN    

                PO                              2014 23:26            

 

             NS  1000 ML IV [PREDEFINED]   (7983)       [id number]                  

               CONT IV 

                IV                              2014 09:29            

 

             ~~ NACL 0.9% (7983) 1000ML IV BAG       248748       1000         M

L                  

                                                                     

 

             NORCO [HYDROCODONE/APAP] 10/325MG TAB       [id number]       1         

   TAB          PRN  

                PO                              2014 09:29            

 

             MORPHINE  INJ: 2MG/ML 1 ML SYRINGE       [id number]       2            

MG           PRN      

                IVP                             2014 09:30            

 

             NICOTINE (NICODERM) PATCH:  21MG/24HR       [id number]       21    

       MG           

PRN DAILY       TOPICAL                         2014 12:57            

 

             Norco 325MG-5MG Oral Tablet       453932       1            MILLIGR

AMS       AS 

NEEDED          ORAL                            2014 13:40            

 

             Levaquin 750MG Oral Tablet       [id number]       750          MILLIGRA

MS       DAILY  

                ORAL                            2014 13:40            

 

             Flagyl 500MG Oral Tablet       [id number]       500          MILLIGRAMS

       THREE 

TIMES A DAY       ORAL                            2014 13:40            



                                                                                
                                                                                
                                      



Medications Administered

          



           Medication       Dose       Units       Frequency       Route       D

ate/Time of

 Last Dose    

 

             PANTOPRAZOLE [PROTONIX] INJ VIAL: 40 MG       40           MG      

     DAILY        SIVP

                                        2014 09:56    

 

             METRONIDazole [FLAGYL] IV BAmg       1            EA         

  Q8H          IVPB      

                                        2014 04:42    

 

           FENTANYL AMP :50 mcg/ml 2ML AMP       25         MCG        PRN      

  IVP        

2014 05:03    

 

             LEVOFLOXACIN [LEVAQUIN] IV BAMG       1            EA        

   Q24H         IVPB    

                                        2014 23:27    

 

           ACETAMINOPHEN [TYLENOL] TABS 325MG       650        MG         PRN   

     PO         

2014 01:23    

 

             NS  1000 ML IV [PREDEFINED]   (7983)       1            EA         

  CONT IV       IV    

                                        2014 04:44    

 

           NORCO [HYDROCODONE/APAP] 10/325MG TAB       1          EA         PRN

        PO         

2014 07:34    



                                                                                
                                                



Encounters

          Unknown.                                                          



Social History

          



                Smoking Status       Code            Start Date       End Date  

  

 

                Current every day smoker       [id number]                        

    



                                                                    



Patient Decision Aids

          Unknown.                                                          



Instructions

          



                                                



You were admitted to [facility] on 2014.        



You were discharged from [facility] on 2014.        



Should you have any questions prior to discharge, please contact a member of 
your healthcare team. If you have left the hospital and have any questions, 
please contact your primary care physician.                  HOME DIET:         
     Regular.                                CONDITION AT DISMISSAL             
 Stable.                                HOME MEDICATION INSTRUCTIONS:           
   Take only the medications listed above..                                HOME 
MEDS RETURNED TO PATIENT:              N/A.                                
ACTIVITY INSTRUCTIONS(list limitations):              Activity as Tolerated.    
                            INSTRUCTED TO BRING THESE INSTR TO NEXT OFFICE VISIT
              No.                                INSTRUCTIONS GIVEN AND 
DISCHARGE TO:              Patient.                                VOICES 
UNDERSTANDING OF INSTRUCTIONS:              Yes.                                
INSTRUCTIONS GIVEN BY (TYPE IN NAME AND DATE)              YAMILEX MAGANA RN         
                       CONTACT YOUR PHYSICIAN IF YOU EXPERIENCE ANY:            
  INCREASE IN PAIN, FEVER                                PRIMARY CARE PHYSICIAN 
OR PRACTITIONER:              Gino Castillo MD, [phone].                   
             FOLLOW UP CARE - SEE YOUR PHYSICIAN:              SEE DR. BERKOWITZ IN
 1 WEEK, CALL FOR APPOINTMEN TIME, OFFICE CLOSED.                               
 SCRIPTS WRITTEN BY DOCTOR GIVEN TO PATIENT?              NORCO, FLAGYL, 
LEVAQUIN                                PAIN MANAGEMENT:              "Pain 
Management at Home" instruct given, Medication, Side effects.              Tx. 
of constipation, Non drug control methods, Managing ineffective control.        
      When to contact physician, Verbalizes understanding of instructions.      
                          CHIEF COMPLAINT:              Pain started during the 
night on the . Started her period but it was a              pain she never 
had that type of pain before. Then the fever and chills              started.   
                                     



                                                          



Chief Complaint and Reason For Visit

          



                          Chief Complaint           Date of Onset    

 

                          DIVERTICULITIS                 



                                                          



Function Status

          Unknown.                                                          



Plan of Care

          Unknown.                                                          



Referral/Transition of Care

          Unknown.

## 2020-05-02 NOTE — XMS REPORT
[facility]

                             Created on: 10/02/2017



Aide Huang

External Reference #: [id number]

: 1965

Sex: Female



Demographics





                          Address                   [address] [place]  [address]

 

                          Preferred Language        Unknown

 

                          Marital Status            Unknown

 

                          Yarsanism Affiliation     Unknown

 

                          Race                      Unknown

 

                          Ethnic Group              Unknown





Author





                          Author                    Aide HUSTON

 

                          Organization              [facility]

 

                          Address                   [address] [place]  [address]



 

                          Phone                     [phone]







Care Team Providers





                    Care Team Member Name Role                Phone

 

                    JUDY HUSTON   Unavailable         [phone]







PROBLEMS





          Type      Condition ICD9-CM Code NBQ66-GR Code Onset Dates Condition S

tatus SNOMED 

Code

 

          Problem   Primary osteoarthritis of both knees           M17.0        

       Active    638383978

 

                Problem         Gastroesophageal reflux disease, esophagitis pre

sence not specified                 

K21.9                                   Active              [id number]

 

          Problem   Essential hypertension           I10                 Active 

   [id number]







ALLERGIES

No Information



SOCIAL HISTORY

Never Assessed



PLAN OF CARE





VITAL SIGNS





MEDICATIONS

No Known Medications



RESULTS

No Results



PROCEDURES

No Known procedures



IMMUNIZATIONS

No Known Immunizations



MEDICAL (GENERAL) HISTORY





                    Type                Description         Date

 

                    Medical History     hypertension         

 

                    Medical History     Fluid on knees       

 

                    Surgical History    knee surgery Lt X 2  

 

                    Surgical History    tubal ligation       

 

                    Surgical History    lump removed Lt breast  

 

                    Surgical History    ovary removed Lt     

 

                    Surgical History    tonsillectomy and adenoidectomy  

 

                    Surgical History    tubes put in ears    

 

                    Surgical History    colonoscopy          

 

                    Surgical History    heart cath          2017

 

                    Hospitalization History surgeries            

 

                    Hospitalization History child birth

## 2020-05-02 NOTE — XMS REPORT
[facility]

                             Created on: 2018



Aide Huang

External Reference #: [id number]

: 1965

Sex: Female



Demographics





                          Address                   [id number]

[place]  [address]

 

                          Preferred Language        Unknown

 

                          Marital Status            Unknown

 

                          Shinto Affiliation     Unknown

 

                          Race                      Unknown

 

                          Ethnic Group              Unknown





Author





                          Author                    Aide HUSTON

 

                          Organization              [facility]

 

                          Address                   [address] [place]  [address]



 

                          Phone                     [phone]







Care Team Providers





                    Care Team Member Name Role                Phone

 

                    JUDY HUSTON   Unavailable         [phone]







PROBLEMS





          Type      Condition ICD9-CM Code TNQ74-SZ Code Onset Dates Condition S

tatus SNOMED 

Code

 

          Problem   Primary osteoarthritis of both knees           M17.0        

       Active    [id number]

 

                Problem         Gastroesophageal reflux disease, esophagitis pre

sence not specified                 

K21.9                                   Active              [id number]

 

          Problem   Essential hypertension           I10                 Active 

   [id number]







ALLERGIES

No Known Allergies



ENCOUNTERS





                Encounter       Location        Date            Diagnosis

 

                    [facility]      [id number] [place] [address]                                     Weight gain R63.5

 

                    [facility]      [id number] [place] [address] 26 Sep, 

2017                                    Essential hypertension I10 ; Primary ost

eoarthritis of both knees M17.0 ; 

Gastroesophageal reflux disease, esophagitis presence not specified K21.9 and 
Encounter for immunization Z23

 

                    [facility]      [address] [id number] [place] [address]                                     Gastroesophageal reflux disease, esophag

itis presence not specified K21.9

 

                    [facility]      [id number] [place] [address] 14 2017                                    Essential hypertension I10 and Gastroeso

phageal reflux disease, esophagitis

presence not specified K21.9

 

                    [facility]      [id number] [place] [address]                                      

 

                    [facility]      [id number] [place] [address] 16 May, 

2017                                     

 

                    [facility]      [id number] [place] [address] 16 May, 

2017                                     

 

                    [facility]      [id number] [place] [address] 14 Mar, 

2017                                    Essential hypertension I10

 

                    [facility]       [id number] [place] [address] 09 Mar, 

2017                                    Essential hypertension I10

 

                    [facility]      [facility] [id number] [place] [address] 07 Mar, 

2017                                     

 

                    [facility] [id number] [place] [address]                                     Essential hypertension I10 and Shortness

 of breath R06.02

 

                    [facility]      [facility] [id number] [place] [address]                                     Essential hypertension I10 and Shortness

 of breath R06.02

 

                    [facility]      [facility] [id number] [place] [address]                                     Shortness of breath R06.02 ; Essential h

ypertension I10 and Tobacco 

dependence F17.200

 

                    [facility]      [facility] [id number] [place] [address]                                     Essential hypertension I10 and Shortness

 of breath R06.02







IMMUNIZATIONS





                Vaccine         Route           Administration Date Status

 

                FLUARIX QUAD (3 AND UP)  IM Intramuscular 2017   Ad

ministered







SOCIAL HISTORY

Never Assessed



REASON FOR VISIT

HTN, Pt needs refill on B/P medication. SAHRA Roach



PLAN OF CARE





                          Activity                  Details

 

                                         

 

                          Follow Up                 3 Months Reason:htn f/u







VITAL SIGNS





                    Height              65 in               2017

 

                    Weight              174.5 lbs           2017

 

                    Temperature         97.6 degrees Fahrenheit 2017

 

                    Heart Rate          90 bpm              2017

 

                    Respiratory Rate    18                  2017

 

                    BMI                 29.04 kg/m2         2017

 

                    Blood pressure systolic 130 mmHg            2017

 

                    Blood pressure diastolic 84 mmHg             2017







MEDICATIONS





        Medication Instructions Dosage  Frequency Start Date End Date Duration S

tatus

 

        Amlodipine Besylate 5 mg Orally Once a day in AM 1 tablet               

          30 days Active

 

        Combivent Respimat  MCG/ACT Inhalation Four times a day 1 puff  6h

                              

Active

 

        Metoprolol Tartrate 25 MG Orally Twice a day 1 tablet with food 12h     

                30 days 

Active

 

        Excedrin Migraine 250-250-65 MG Orally PRN 3 tabs                       

           Active

 

             Diclofenac Sodium 75 MG Orally Twice a day 1 tablet with food or mi

lk 12h          26 

Sep, 2017           25 Dec, 2017        30 day(s)           Active

 

          Pantoprazole Sodium 40 mg Orally Once a day 1 tablet  24h       26 Sep

, 2017           30 

day(s)                                  Active







RESULTS

No Results



PROCEDURES





                Procedure       Date Ordered    Result          Body Site

 

                FLUARIX QUAD (3 & UP)-GSK-2015                    

 

                SINGLE IMMUNIZATION ADMIN 2017                    







INSTRUCTIONS





MEDICATIONS ADMINISTERED

No Known Medications



MEDICAL (GENERAL) HISTORY





                    Type                Description         Date

 

                    Medical History     hypertension         

 

                    Medical History     Fluid on knees       

 

                    Surgical History    knee surgery Lt X 2  

 

                    Surgical History    tubal ligation       

 

                    Surgical History    lump removed Lt breast  

 

                    Surgical History    ovary removed Lt     

 

                    Surgical History    tonsillectomy and adenoidectomy  

 

                    Surgical History    tubes put in ears    

 

                    Surgical History    colonoscopy          

 

                    Surgical History    heart cath          2017

 

                    Hospitalization History surgeries            

 

                    Hospitalization History child birth

## 2020-05-02 NOTE — XMS REPORT
[facility]

                             Created on: 2017



Aide Huang

External Reference #: [id number]

: 1965

Sex: Female



Demographics





                          Address                   [address] [place]  [address]

 

                          Preferred Language        Unknown

 

                          Marital Status            Unknown

 

                          Orthodoxy Affiliation     Unknown

 

                          Race                      Unknown

 

                          Ethnic Group              Unknown





Author





                          Author                    Aide HUSTON

 

                          Organization              [facility]

 

                          Address                   [address] [place]  [address]



 

                          Phone                     [phone]







Care Team Providers





                    Care Team Member Name Role                Phone

 

                    JUDY HUSTON   Unavailable         [phone]







PROBLEMS





          Type      Condition ICD9-CM Code HDI87-IO Code Onset Dates Condition S

tatus SNOMED 

Code

 

          Problem   Primary osteoarthritis of both knees           M17.0        

       Active    [id number]

 

                Problem         Gastroesophageal reflux disease, esophagitis pre

sence not specified                 

K21.9                                   Active              847140161

 

          Problem   Essential hypertension           I10                 Active 

   [id number]







ALLERGIES

No Information



SOCIAL HISTORY

Never Assessed



PLAN OF CARE





VITAL SIGNS





MEDICATIONS

No Known Medications



RESULTS

No Results



PROCEDURES

No Known procedures



IMMUNIZATIONS

No Known Immunizations



MEDICAL (GENERAL) HISTORY





                    Type                Description         Date

 

                    Medical History     hypertension         

 

                    Medical History     Fluid on knees       

 

                    Surgical History    knee surgery Lt X 2  

 

                    Surgical History    tubal ligation       

 

                    Surgical History    lump removed Lt breast  

 

                    Surgical History    ovary removed Lt     

 

                    Surgical History    tonsillectomy and adenoidectomy  

 

                    Surgical History    tubes put in ears    

 

                    Surgical History    colonoscopy          

 

                    Surgical History    heart cath          2017

 

                    Hospitalization History surgeries            

 

                    Hospitalization History child birth

## 2020-05-02 NOTE — XMS REPORT
Discharge Summary 2.1

                             Created on: 2019



GEOFFREY CHANEY

External Reference #: [id number]

: 1965

Sex: Female



Demographics





                          Address                   [address]

[place]  [address]

 

                          Home Phone                [phone]

 

                          Preferred Language        Unknown

 

                          Marital Status            

 

                          Moravian Affiliation     Unknown

 

                          Race                      White

 

                          Ethnic Group              Not  or 





Author





                          Author                    GEOFFREY NOVAK

 

                          Organization              Unknown

 

                          Address                   [address]

[place], [place]  [id number]



 

                          Phone                     [phone]







Care Team Providers





                    Care Team Member Name Role                Phone

 

                          [employer]                [phone]

 

                    SUMMER MARTÍNEZ [facility]              [phone]

 

                    ISAMAR HORTA MD   Attending           [phone]

 

                    [facility]            [phone]

 

                    [facility]            [phone]

 

                    ROBEL MACIAS [facility]            [phone]



                                                      



Functional Status

                      



                                                         

 

                                        No Data Found                



                                                                    



Immunization

                      



                Immunization                   Date                   Status    

               

Additional Notes                   Code                      Code System        

   

    

 

                                                            pneumococcal polysac

charide PPV23                           

                          pneumococcal polysaccharide PPV23                   12

/15/2018         

                Completed                                       33              

     

CVX                

 

                                                            pneumococcal polysac

charide PPV23                           

                          pneumococcal polysaccharide PPV23                   10

/         

                Completed                                       33              

     

CVX                

 

                                                            Influenza, seasonal,

 injectable                             

                          Influenza, seasonal, injectable                   10/3

             

                Completed                                       141             

      CVX  

             

 

                                                            influenza, injectabl

e, quadrivalent, preservative free      

                                        influenza, injectable, quadrivalent, pre

servative 

free                   12/15/2018                   Completed                   

                          150                       CVX                



                                                                                
                           



Mental Status

                      



                                                         

 

                                        No Data Found                



                                                                                
                 



Results

                      



                                        CBC W/ AUTO DIFF (RFLX MAN DIFF IF IND) 

- Collect Date/Time: 2019 06:20   

             

 

                                        [place] [facility]                 

 

                                        ID: [id number]

                 

 

                                        [address], [place], [place], [id number]

                 

 

                                        LOINC: 10727-3                

 

                Test                   Value                   Unit             

      Reference 

Range                     Code                      Code System                

 

                                              WBC                               

      16.2                

                    TH/CMM                   L=4.5      H=10.8                  

 21867-6          

                                        LOINC                

 

                                              RBC                               

      3.99                

                    ML/CMM                   L=4.20     H=5.40                  

 789-8            

                                        LOINC                

 

                                              HGB                               

      12.1                

                    G/DL                   L=12.0     H=16.0                   7

18-7              

                                        LOINC                

 

                                              HCT                               

      36.9                

                    %                   L=37.0     H=47.0                   4544

-3                

                                        LOINC                

 

                                              MCV                               

      93                  

                FL                   L=81       H=99                            

                

           

 

                                              MCH                               

      30.3                

                PG                   L=27.0     H=33.0                          

              

               

 

                                              MCHC                              

       32.8               

                    G/DL                   L=31.0     H=36.0                    

                 

                                                         

 

                                              RDW SD                            

         41               

                FL                   L=36       H=50                            

             

              

 

                                              RDW CV                            

         12.1             

                    %                   L=0.0      H=14.8                       

               

                                                         

 

                                              MPV                               

      10.7                

                FL                   L=9.3      H=12.5                          

              

               

 

                                              PLT                               

      194                 

                    TH/CMM                   L=130      H=440                   

777-3              

                                        LOINC                

 

                                              NRBC#                             

        0.00              

                    TH/CMM                   L=0.00     H=0.00                  

                

                                                         

 

                                              NRBC%                             

        0.0               

                    /100WBC                   L=0.0      H=2.0                  

                 

                                                         

 

                                              %NEUT                             

        92.3              

                %                                                               

            



 

                                              %LYMP                             

        4.9               

                %                                                               

             

 

                                              %MONO                             

        2.2               

                %                                                               

             

 

                                              %EOS                              

       0.0                

                %                                                               

             

 

                                              %BASO                             

        0.1               

                %                                                               

             

 

                                              #NEUT                             

        14.97             

                    TH/CMM                   L=2.10     H=8.20                  

               

                                                         

 

                                              #LYMP                             

        0.80              

                    TH/CMM                   L=0.90     H=5.20                  

                

                                                         

 

                                              #MONO                             

        0.35              

                    TH/CMM                   L=0.16     H=1.00                  

                

                                                         

 

                                              #EOS                              

       0.00               

                    TH/CMM                   L=0.00     H=0.80                  

                 

                                                         

 

                                              #BASO                             

        0.01              

                    TH/CMM                   L=0.00     H=0.20                  

                

                                                         

 

                                              MANUAL DIFF                       

              SEE BELOW   

                                                                                

 

           

 

                                              SEGS                              

       83                 

                %                                                               

             

 

                                              BANDS                             

        14                

                %                                                               

             

 

                                              LYMPHS                            

         3                

                %                                                               

             

 

                                              MONOS                             

                          

                                                                                

             

 

                                        EOS                                     

                    

                                                                             

 

                                              BASO                              

                          

                                                                                

             

 

                                              METAS                             

                          

                                                                                

             

 

                                              MYELO                             

                          

                                                                                

             

 

                                              PROS                              

                          

                                                                                

             

 

                                              BLASTS                            

                          

                                                                                

             

 

                                              ATYP LYMPHS                       

                          

                                                                                

         

   

 

                                              RBC MORPH                         

                          

                                                                                

           

 

 

                                                          

 

                                        BASIC METABOLIC PANEL - Collect Date/Andrews

e: 2019 06:20                 

 

                                        [place] [facility]                 

 

                                        ID: [id number]

                 

 

                                        [address], [place], [place], [id number]

                 

 

                                        LOINC: 17104-7                

 

                Test                   Value                   Unit             

      Reference 

Range                     Code                      Code System                

 

                                              GLUCOSE                           

          291             

                    MG/DL                   L=70       H=100                   2

345-7          

                                        LOINC                

 

                                              SODIUM                            

         136              

                    MEQ/L                   L=135      H=148                   2

951-2           

                                        LOINC                

 

                                              POTASSIUM                         

            4.3           

                    MEQ/L                   L=3.5      H=5.3                   2

823-3        

                                        LOINC                

 

                                              CHLORIDE                          

           105            

                    MEQ/L                   L=96       H=110                   2

075-0         

                                        LOINC                

 

                                              CO2                               

      16                  

                    MEQ/L                   L=22       H=29                   20

28-9                

                                        LOINC                

 

                                              BUN                               

      14                  

                    MG/DL                   L=8        H=22                   30

94-0                

                                        LOINC                

 

                                              CREATININE                        

             0.95         

                    MG/DL                   L=0.57     H=1.11                   

2160-0     

                                        LOINC                

 

                                              CALCIUM                           

          9.0             

                    MG/DL                   L=8.2      H=10.6                   

35525-2        

                                        LOINC                

 

                                              AGE                               

      54                  

                yrs                                                             

               

 

                                              GFR NonAA                         

            62            

                                                                                

          

  

 

                                              GFR AA                            

         75               

                                                                                

             

 

                                              eGFR                              

       62                 

                mL/min/1.7                                                      

               

      

 

                                              eGFR AA*                          

           >60            

                                                                                

          

  

 

                                                          

 

                                        PREGNANCY TEST - Collect Date/Time:  08:40                 

 

                                        [email or web address]                 

 

                                        ID: [id number]  

               

 

                                        [address], [place], [place], [id number]

                 

 

                                        LOINC: 2118                

 

                Test                   Value                   Unit             

      Reference 

Range                     Code                      Code System                

 

                                              PREGNANCY TEST                    

                 NEGATIVE 

                                                            2118              

   

                                        LOINC                

 

                                                          

 

                                        KNEE 1V OR 2V - Completed: 12/10/2019 13

:40                 

 

                                        LOINC:                

 

                                        EXAMINATION:KNEE 1V OR 2VREASON FOR EXAM

:Post-Op Evaluation  Left/Right?:  right

COMPARISON:None available.TECHNIQUE: AP and lateral views of the right knee were
obtained.  FINDINGS:There is a right total knee arthroplasty.  No periprosthetic
lucency or fracture is seen.  Alignment is near anatomic.  IMPRESSION:Right 
total knee arthroplasty.Reviewed and Electronically Signed by:  Demetrius Beal MD
DABRSigned Date/Time: 12/10/2019 4:42 PMJob ID#: [id number]                 



                                                                                
                                     



Social History

                      



                Type                   Status                   Start Date      

             End

Date                      Code                      Code System                

 

                    Smoking History                   Current every day smoker  

                    

                                        [id number]                   SNOMED-CT   

    

        



                                                                                
       



Vital Signs

                      



                Vital Sign                   Value                   Unit       

            

[place] Value                   [place] Unit                   Date/Time      

                    Recent/Initial?                   Code                   Cod

e System

               

 

                          Systolic Blood Pressure                               

        139               

                     mm[Hg]                                                     

                    2019 11:51                   Most Recent              

     8480-6   

                                        LOINC                

 

                          Diastolic Blood Pressure                              

         92               

                      mm[Hg]                                                    

                    2019 11:51                   Most Recent              

     8462-4  

                                        LOINC                

 

                          Systolic Blood Pressure                               

        134               

                      mm[Hg]                                                    

                    2019 09:18                   Initial                  

 8480-6      

                                        LOINC                

 

                          Diastolic Blood Pressure                              

         87               

                      mm[Hg]                                                    

                    2019 09:18                   Initial                  

 8462-4      

                                        LOINC                

 

                          O2 Saturation                                       92

                          

                %                                                           

2019 11:51                   Most Recent                   24554-9        

                                        LOINC                

 

                          O2 Saturation                                       10

0                         

                %                                                           

2019 09:18                   Initial                   32335-3            

                                        LOINC                

 

                          Pulse                                         94.0    

                            

                /min                                                           1

2019 

11:51                   Most Recent                   8867-4                   

LOINC                

 

                          Pulse                                         81.0    

                            

                /min                                                           1

2019 

09:18                   Initial                   8867-4                   LOINC

                

 

                          Respiration                                       18  

                          

                /min                                                           

2019 11:51                   Most Recent                   9279-1         

                                        LOINC                

 

                          Respiration                                       16  

                          

                /min                                                           

2019 09:18                   Initial                   9279-1             

                                        LOINC                

 

                          Temperature                                       36.6

                          

                Fabi                   97.9                   F                  

 

2019 11:51                   Most Recent                   8310-5         

                                        LOINC                

 

                          Temperature                                       36.1

                          

                Fabi                   97.0                   F                  

 

12/10/2019 14:05                   Initial                   8310-5             

                                        LOINC                



                                                                              



Assessment

                      



                                                            



You had the following problems:                                          STATUS 
POST KNEE REPLACEMENT                                                      



                                                                    



Hospital Discharge Instructions

                      



                                                            



Should you have any questions prior to discharge, please contact a member of 
your healthcare team. If you have left the hospital and have any questions, 
please contact your primary care physician.                    



INSTRUCTIONS GIVEN AND DISCHARGE TO:                                          
Patient, Spouse/SO, Discharged to:__HOME__  With:__S/O_.                        
                



INSTRUCTIONS GIVEN BY (TYPE IN NAME AND DATE)                                   
       SONIA NOVAK RN                                                      



                                                                              



Reason For Referral

                      



                                                         

 

                                        No Data Found                



                                                                    



Hospital Course

                      



                                                            



You were admitted to        [facility] on 12/10/2019 08:17 with a principal 
diagnosis of Unilateral primary osteoarthritis, right knee                    



You were discharged from        [facility] on 2019 15:46              
                    



                                                                              



Medications

                      



                    Medication                   Start Date                   En

d Date              

                Route                   Frequency                   Dose        

           

Code                      Code System                   Medication Instructions 

   

            

 

                                                            Spironolactone 25MG 

Oral Tablet                             

                    2019                   Unknown                   ORAL 

            

                    DAILY                   25 MILLIGRAMS                   3130

96            

                          RxNorm                                        25 TYRA

GRAMS ORAL DAILY    

                              

 

                                                            Potassium Chloride 2

0MEQ Oral Tablet, Extended Release      

                               2019                   Unknown             

                    ORAL                   AS NEEDED                   20 MEQ   

              

                    [id number]                   RxNorm                            

           20 MEQ 

ORAL AS NEEDED                                  

 

                                                            Losartan Potassium 5

0MG Oral Tablet                         

                    2019                   Unknown                   ORAL 

        

                    DAILY                   50 MILLIGRAMS                   9794

92        

                          RxNorm                                        50 TYRA

GRAMS ORAL DAILY

                                  

 

                                              Lasix 20MG Oral Tablet            

                         

2019                   Unknown                   ORAL                   AS

 NEEDED                   20 MILLIGRAMS                   334952                

                          RxNorm                                        20 TYRA

GRAMS ORAL AS NEEDED    

                              

 

                                                            Carvedilol 6.25MG Or

al Tablet                               

                    2019                   Unknown                   ORAL 

              

                    TWO TIMES A DAY                   6.25 MILLIGRAMS           

        2000  

                          RxNorm                                        6.25 MIL

LIGRAMS 

ORAL TWO TIMES A DAY                                  

 

                                                            Atorvastatin Calcium

 40MG Oral Tablet                       

                    2019                   Unknown                   ORAL 

      

                    AT BEDTIME                   40 MILLIGRAMS                  

 981103 

                          RxNorm                                        40 TYRA

GRAMS 

ORAL AT BEDTIME                                  

 

                                                            Albuterol Sulfate 0.

09MG/1Actuation Inhalation Suspension   

                                  2019                   Unknown          

                    INHALATION                   AS NEEDED                   1 P

UFF        

                    [id number]                   RxNorm                            

         

  1 PUFF INHALATION AS NEEDED                                  

 

                                                            Advair Diskus 500/50

 0.5MG-0.05MG/Actuati Inhalation Disk   

                                  2019                   Unknown          

                    INHALATION                   TWO TIMES A DAY                

   1 PUFF  

                    872392                   RxNorm                             

   

       1 PUFF INHALATION TWO TIMES A DAY                                  

 

                                                            oxyCODONE HCl 5MG Or

al Tablet                               

                    2019                   Unknown                   BY MO

UT           

                    AS NEEDED                                       7181409     

            

                          RxNorm                                        1-2 TABL

ET BY MOUTH AS NEEDED FOR

 PAIN                                  

 

                                              ASPIRIN                           

          2019      

                    Unknown                   ORAL                   DAILY      

       

                    81 MILLIGRAMS                                       RxNorm  

              

                                                            81 MILLIGRAMS ORAL D

AILY                                 

 



                                                                                
                                                                                
                  



Procedures

                      



                    Procedure Name                   Date                   Stat

us                  

                          Code                      Code System                

 

                                                            Replacement of Right

 Knee Joint with Synthetic Substitute, 

Cemented, Open                                     12/10/2019                   

completed                   [id number]                   ICD10 PCS                



                                                                                
        



Implants

                      



                Implanted                   ERICK                   Status        

           

Assigning Authority                   Procedure                   Date          

      

 

                    ADVANCE STATURE FEMORAL SIZE 3 RIGHT                        

               

Active                                              TOTAL RIGHT KNEE REPLACEMENT

       

                                        12/10/2019                

 

                    ADVANCE II TIBIAL BASE SIZE 3 STD                           

            Active  

                                                    TOTAL RIGHT KNEE REPLACEMENT

               

                                        12/10/2019                

 

                                        ADVANCE II MEDIAL PIVOT INSERT SIZE 3 RI

GHT MEDIAL PIVOT THICKNESS 10MM         

                                    Active                                      

 TOTAL

 RIGHT KNEE REPLACEMENT                   12/10/2019                

 

                          ADVANCE ONLAY ALL-POLY PATELLA SIZE 35 MM THICKNESS 8M

M                         

                    Active                                       TOTAL RIGHT KNE

E 

REPLACEMENT                             12/10/2019                

 

                          Orthopaedic cement, non-medicated                   

[id number]                   Active                    

FDA                       TOTAL RIGHT KNEE REPLACEMENT                   12/10/2

019 

               



                                                                                
                                                



Problems

                      



                    Problem                   Start Date                   Resol

jael Date            

                    Status                   Code                   Code System 

             

  

 

                    STATUS POST KNEE REPLACEMENT                                

                    

                    active                   [id number]                   SNO

MED-CT       

         

 

                    DIVERTICULITIS                                       

018                 

                    resolved                   [id number]                   SNOME

D-CT              

  

 

                CHEST PAIN                                       12/10/2019     

              

resolved                   [id number]                   SNOMED-CT                

 

                    PULMONARY EDEMA                                       12/10/

2019                

                    resolved                   [id number]                   SNOMED

-CT              

  

 

                HYPERTENSION                                       12/10/2019   

                

resolved                   12742120                   SNOMED-CT                



                                                                                
                                                



Allergies

                      



                    Allergy Substance                   Reaction                

   Severity         

                    Start Date                   Concern Status                 

  Code    

                                        Code System                

 

                                              LISINOPRIL                        

                          

                                                                Mild to Moderate

                                                               Active           

      

                          59344                     RxNorm                



                                                                                
        



Plan of Treatment

                      



                                                         

 

                                        No Data Found                



                                                                    



Encounters

                      



                                                         

 

                                        No Data Found                



                                                                    



Goals

                      



                                                         

 

                                        No Data Found                



                                                                    



Discharge Medications

                      



                                                         

 

                                        No Data Found                



                                                                                
                    



Discharge Diagnosis

                      



                    Discharge Diagnosis                   Diagnosis Code        

           Start 

Date                

 

                          Unilateral primary osteoarthritis, right knee         

                     

                                        12/10/2019                



                                                                              



Health Concerns Section

                      



                                                         

 

                                        No Data Found
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
## 2020-05-02 NOTE — XMS REPORT
[facility]

                             Created on: 2018



Aide Huang

External Reference #: [id number]

: 1965

Sex: Female



Demographics





                          Address                   [address]

[place]  [address]

 

                          Preferred Language        Unknown

 

                          Marital Status            Unknown

 

                          Orthodox Affiliation     Unknown

 

                          Race                      Unknown

 

                          Ethnic Group              Unknown





Author





                          Author                    Aide HUSTON

 

                          Organization              [facility]

 

                          Address                   [address] [place]  [address]



 

                          Phone                     [phone]







Care Team Providers





                    Care Team Member Name Role                Phone

 

                    ROBELMARQUISEJUDY   Unavailable         [phone]







PROBLEMS





          Type      Condition ICD9-CM Code JXR47-VB Code Onset Dates Condition S

tatus SNOMED 

Code

 

          Problem   Primary osteoarthritis of both knees           M17.0        

       Active    900591467

 

                Problem         Gastroesophageal reflux disease, esophagitis pre

sence not specified                 

K21.9                                   Active              [id number]

 

          Problem   Essential hypertension           I10                 Active 

   [id number]







ALLERGIES

No Information



ENCOUNTERS





                Encounter       Location        Date            Diagnosis

 

                    [facility]      [address] [id number] [place] [address]                                     Weight gain R63.5

 

                    [facility]      [address] [id number] [place] [address] 26 Sep, 

2017                                    Essential hypertension I10 ; Primary ost

eoarthritis of both knees M17.0 ; 

Gastroesophageal reflux disease, esophagitis presence not specified K21.9 and 
Encounter for immunization Z23

 

                    [facility]      [address] [id number] [place] [address]                                     Gastroesophageal reflux disease, esophag

itis presence not specified K21.9

 

                    [facility]      [address] [id number] [place] [address] 14 2017                                    Essential hypertension I10 and Gastroeso

phageal reflux disease, esophagitis

presence not specified K21.9

 

                    [facility]      [address] [id number] [place] [address]                                      

 

                    [facility]      [address] [id number] [place] [address] 16 May, 

2017                                     

 

                    [facility]      [address] [id number] [place] [address] 16 May, 

2017                                     

 

                    [facility]      [address] [id number] [place] [address] 14 Mar, 

2017                                    Essential hypertension I10

 

                    [facility]  [id number] [place] [address] 09 Mar, 

2017                                    Essential hypertension I10

 

                    [facility]  [id number] [place] [address] 07 Mar, 

2017                                     

 

                    [facility]  [id number] [place] [address]                                     Essential hypertension I10 and Shortness

 of breath R06.02

 

                    [facility]  [id number] [place] [address]                                     Essential hypertension I10 and Shortness

 of breath R06.02

 

                    [facility]  [id number] [place] [address]                                     Shortness of breath R06.02 ; Essential h

ypertension I10 and Tobacco 

dependence F17.200

 

                    [facility]      [facility]  [id number] [place] [address]                                     Essential hypertension I10 and Shortness

 of breath R06.02







IMMUNIZATIONS

No Known Immunizations



SOCIAL HISTORY

Never Assessed



REASON FOR VISIT

med refills



PLAN OF CARE





VITAL SIGNS





MEDICATIONS





        Medication Instructions Dosage  Frequency Start Date End Date Duration S

tatus

 

        Meloxicam 15 mg Orally Once a day 1 tablet 24h     13 2017         

30 day(s) Active

 

          Pantoprazole Sodium 20 mg Orally Once a day 1 tablet  24h                  30 

day(s)                                  Active







RESULTS

No Results



PROCEDURES

No Known procedures



INSTRUCTIONS





MEDICATIONS ADMINISTERED

No Known Medications



MEDICAL (GENERAL) HISTORY





                    Type                Description         Date

 

                    Medical History     hypertension         

 

                    Medical History     Fluid on knees       

 

                    Surgical History    knee surgery Lt X 2  

 

                    Surgical History    tubal ligation       

 

                    Surgical History    lump removed Lt breast  

 

                    Surgical History    ovary removed Lt     

 

                    Surgical History    tonsillectomy and adenoidectomy  

 

                    Surgical History    tubes put in ears    

 

                    Surgical History    colonoscopy          

 

                    Surgical History    heart cath          2017

 

                    Hospitalization History surgeries            

 

                    Hospitalization History child birth

## 2020-05-02 NOTE — XMS REPORT
Discharge Summary 2.1

                                  Created on: 



GEOFFREY CHANEY

External Reference #: [id number]

: 1965

Sex: Female



Demographics





                          Address                   [address]

[place]  [address]

 

                          Home Phone                [phone]

 

                          Preferred Language        Unknown

 

                          Marital Status            

 

                          Adventist Affiliation     Unknown

 

                          Race                      White

 

                          Ethnic Group              Not  or 





Author





                          Author                    GEOFFREY NOVAK

 

                          Organization              Unknown

 

                          Address                   [address]

[place], [place]  [address]



 

                          Phone                     [phone]







Care Team Providers





                    Care Team Member Name Role                Phone

 

                          [employer]                [phone]

 

                    CRISTAL WEBER MD  Attending           [phone]

 

                    [facility]              [phone]

 

                    ROBEL MACIAS [facility]            [phone]



                                            



Functional Status

                      



                                                         

 

                                        No Data Found                



                                                                              



Immunization

                      



                Immunization                   Date                   Status    

               

Additional Notes                   Code                      Code System        

   

    

 

                          pneumococcal polysaccharide PPV23                   12

/15/2018                  

                Completed                                       33              

     CVX        

       

 

                          influenza, injectable, quadrivalent, preservative free

                   

12/15/2018                   Completed                                       150

                                        CVX                



                                                                                
       



Mental Status

                      



                                                         

 

                                        No Data Found                



                                                                                
                 



Results

                      



                                        BASIC METABOLIC PANEL - Collect Date/Andrews

e: 12/15/2018 06:20                 

 

                                        [facility]                 

 

                                        ID: [id number]

                 

 

                                        [address] [facility] [address], [place], [place], [id number]

                 

 

                                        [employer]                 

 

                                        ID: [id number]  

               

 

                                        [address] [facility] [address], [place], [place], [address]

                 

 

                                        LOINC: 49954-0                

 

                Test                   Value                   Unit             

      Reference 

Range                     Code                      Code System                

 

                GLUCOSE                   130                   MG/DL           

        L=70    

  H=100                   2345-7                    LOINC                

 

                SODIUM                   137                   MEQ/L            

       L=135    

 H=148                    2951-2                    LOINC                

 

                POTASSIUM                   4.2                   MEQ/L         

          L=3.5 

    H=5.3                   2823-3                    LOINC                

 

                CHLORIDE                   105                   MEQ/L          

         L=96   

   H=110                   2075-0                    LOINC                

 

                CO2                   23                   MEQ/L                

   L=22       

H=29                      2028-9                    LOINC                

 

                BUN                   24                   MG/DL                

   L=8        

H=22                      3094-0                    LOINC                

 

                CREATININE                   0.8                   MG/DL        

           L=0.6

     H=1.6                   2160-0                    LOINC                

 

                CALCIUM                   10.1                   MG/DL          

         L=8.2  

   H=10.6                   76941-6                   LOINC                

 

                AGE                   53                   yrs                  

                

                                                                     

 

                GFR NonAA                   75                                  

                

                                                                     

 

                GFR AA                   91                                     

                

                                                                     

 

                eGFR                   75                   mL/min/1.7          

                

                                                                     

 

                eGFR AA*                   >60                                  

                

                                                                     

 

                                                          

 

                                        BNP - Collect Date/Time: 12/15/2018 06:2

0                 

 

                                        [facility]                 

 

                                        ID: [id number]

                 

 

                                        [address], [place], [place], [address]

                 

 

                                        [facility]                 

 

                                        ID: [id number]  

               

 

                                        [address], [place], [place], [address]

                 

 

                                        LOINC: 07509-5                

 

                Test                   Value                   Unit             

      Reference 

Range                     Code                      Code System                

 

                BNP                   884                   PG/ML               

    L=0        

H=100                     [address]-6                   LOINC                

 

                                                          

 

                                        CBC W/ AUTO DIFF (RFLX MAN DIFF IF IND) 

- Collect Date/Time: 12/15/2018 06:20   

             

 

                                        [facility]                 

 

                                        ID: [id number]

                 

 

                                        [address], [place], [place], [address]

                 

 

                                        [facility]                 

 

                                        ID: [id number]  

               

 

                                        [address], [place], [place], [address]

                 

 

                                        LOINC: 98768-4                

 

                Test                   Value                   Unit             

      Reference 

Range                     Code                      Code System                

 

                WBC                   7.2                   TH/CMM              

     L=4.5      

H=10.8                    57527-4                   LOINC                

 

                RBC                   3.97                   ML/CMM             

      L=4.20    

H=5.40                    789-8                     LOINC                

 

                HGB                   11.3                   G/DL               

    L=12.0     

H=16.0                    718-7                     LOINC                

 

                HCT                   35.9                   %                  

 L=37.0     

H=47.0                    4544-3                    LOINC                

 

                MCV                   90                   FL                   

L=81       H=99 

                                                                     

 

                MCH                   28.5                   PG                 

  L=27.0     

H=33.0                                                               

 

                MCHC                   31.5                   G/DL              

     L=31.0     

H=36.0                                                               

 

                RDW SD                   48                   FL                

   L=36       

H=50                                                                 

 

                RDW CV                   14.3                   %               

    L=0.0      

H=14.8                                                               

 

                MPV                   11.4                   FL                 

  L=9.3      

H=12.5                                                               

 

                PLT                   278                   TH/CMM              

     L=130      

H=440                     777-3                     LOINC                

 

                NRBC#                   0.00                   TH/CMM           

        L=0.00  

  H=0.00                                                              

 

                NRBC%                   0.0                   /100WBC           

        L=0.0   

  H=2.0                                                              

 

                %NEUT                   77.5                   %                

                

                                                                     

 

                %LYMP                   16.7                   %                

                

                                                                     

 

                %MONO                   5.3                   %                 

                

                                                                     

 

                %EOS                   0.0                   %                  

                

                                                                     

 

                %BASO                   0.1                   %                 

                

                                                                     

 

                #NEUT                   5.55                   TH/CMM           

        L=2.10  

  H=8.20                                                              

 

                #LYMP                   1.20                   TH/CMM           

        L=0.90  

  H=5.20                                                              

 

                #MONO                   0.38                   TH/CMM           

        L=0.16  

  H=1.00                                                              

 

                #EOS                   0.00                   TH/CMM            

       L=0.00   

 H=0.80                                                              

 

                #BASO                   0.01                   TH/CMM           

        L=0.00  

  H=0.20                                                              

 

                MANUAL DIFF                   NOT IND                           

                

                                                                     

 

                                                          

 

                                        MAGNESIUM - Collect Date/Time: 12/15/201

8 06:20                 

 

                                        [facility] [employer]                 

 

                                        ID: [id number]

                 

 

                                        [address] [facility] [address], [place], [place], [address]

                 

 

                                        [facility]                 

 

                                        ID: [id number]  

               

 

                                        [address], [place], [place], [address]

                 

 

                                        LOINC: 38127-7                

 

                Test                   Value                   Unit             

      Reference 

Range                     Code                      Code System                

 

                MAGNESIUM                   2.1                   MG/DL         

          L=1.7 

    H=2.8                   77983-0                   LOINC                

 

                                                          

 

                                        PHOSPHORUS - Collect Date/Time: 12/15/20

18 06:20                 

 

                                        [facility]                 

 

                                        ID: [id number]

                 

 

                                        [address], [place], [place], [address]

                 

 

                                        [facility]                 

 

                                        ID: [id number]  

               

 

                                        [address], [place], [place], [address]

                 

 

                                        LOINC: 2777-1                

 

                Test                   Value                   Unit             

      Reference 

Range                     Code                      Code System                

 

                PHOSPHORUS                   5.3                   MG/DL        

           L=2.5

     H=4.5                   2777-1                    LOINC                

 

                                                          

 

                                        LIPID PANEL - Collect Date/Time: 12/15/2

018 06:20                 

 

                                        [facility] [employer]                 

 

                                        ID: [id number]

                 

 

                                        [address], [place], [place], [address]

                 

 

                                        [employer]                 

 

                                        ID: [id number]  

               

 

                                        [address], [place], [place], [address]

                 

 

                                        LOINC: 13845-2                

 

                Test                   Value                   Unit             

      Reference 

Range                     Code                      Code System                

 

                TRIGLYCERIDES                   83                   MG/DL      

             L=0

       H=135                   3043-7                    LOINC                

 

                CHOLESTEROL                   186                   MG/DL       

            L=0 

      H=199                   2093-3                    LOINC                

 

                HDL                   51                   MG/DL                

   L=29       

H=89                      2085-9                    LOINC                

 

                TOT CHOL/HDL                   3.6                              

         L=0.0  

   H=5.0                                                              

 

                LDL (CALC)                   118                   MG/DL        

           L=0  

     H=129                   85356-1                   LOINC                

 

                                                          

 

                                        TROPONIN-I ADV - Collect Date/Time:  02:03                 

 

                                        [facility]                 

 

                                        ID: [id number]

                 

 

                                         [address] [facility] [address], [place], [place], [address]

                 

 

                                        [facility]                 

 

                                        ID: [id number]  

               

 

                                        [address], [place], [place], [address]

                 

 

                                        LOINC: 55608-6                

 

                Test                   Value                   Unit             

      Reference 

Range                     Code                      Code System                

 

                    TROPONIN-I AD                   < 0.04                   ng/

mL                  

                    L=0.04     H=0.40                   93852-1                 

  LOINC             

  

 

                                                          

 

                                        TROPONIN-I ADV - Collect Date/Time:  20:50                 

 

                                        [employer]                 

 

                                        ID: [id number]  

               

 

                                        [address] [place], [place], [address]

                 

 

                                        [facility]                 

 

                                        ID: [id number]

                 

 

                                        [address], [place], [place], [address]

                 

 

                                        LOINC: 58800-0                

 

                Test                   Value                   Unit             

      Reference 

Range                     Code                      Code System                

 

                    TROPONIN-I AD                   < 0.04                   ng/

mL                  

                    L=0.04     H=0.40                   00753-5                 

  LOINC             

  

 

                                                          

 

                                        TROPONIN-I ADV - Collect Date/Time:  14:37                 

 

                                        [facility]                 

 

                                        ID: [id number]

                 

 

                                         [address] [facility] [address], [place], [place], [id number]

                 

 

                                        [facility]                 

 

                                        ID: [id number]  

               

 

                                        [address] [facility] [address], [place], [place], [address]

                 

 

                                        LOINC: 40121-5                

 

                Test                   Value                   Unit             

      Reference 

Range                     Code                      Code System                

 

                    TROPONIN-I AD                   < 0.04                   ng/

mL                  

                    L=0.04     H=0.40                   45300-6                 

  LOINC             

  

 

                                                          

 

                                        RESPIRATORY PANEL - Collect Date/Time: 2018 14:37                 

 

                                        [employer]                 

 

                                        ID: [id number]  

               

 

                                        [address] [address], [place], [place], [address]

                 

 

                                        [facility]                 

 

                                        ID: [id number]

                 

 

                                         [address], [id number]

                 

 

                                        LOINC:                

 

                Test                   Value                   Unit             

      Reference 

Range                     Code                      Code System                

 

                Adenovirus                   Not Detected                       

                

NEG:  Not Detected                                                              

 

                    Coronavirus 229E                   Not Detected             

                    

                    NEG:  Not Detected                                          

              

 

                    Coronavirus HKU1                   Not Detected             

                    

                    NEG:  Not Detected                                          

              

 

                    Coronavirus NL63                   Not Detected             

                    

                    NEG:  Not Detected                                          

              

 

                    Coronavirus OC43                   Not Detected             

                    

                    NEG:  Not Detected                                          

              

 

                    Human Metapneumoviru                   Not Detected         

                    

                    NEG:  Not Detected                                          

           

  

 

                    Human Rhinov/Enterov                   Not Detected         

                    

                    NEG:  Not Detected                                          

           

  

 

                    Influenza A                   Not Detected                  

                    

                    NEG:  Not Detected                                          

              

 

                    Influenza B                   Not Detected                  

                    

                    NEG:  Not Detected                                          

              

 

                    Parainfluenza Virus1                   Not Detected         

                    

                    NEG:  Not Detected                                          

           

  

 

                    Parainfluenza Virus2                   Not Detected         

                    

                    NEG:  Not Detected                                          

           

  

 

                    Parainfluenza Virus3                   Not Detected         

                    

                    NEG:  Not Detected                                          

           

  

 

                    Parainfluenza Virus4                   Not Detected         

                    

                    NEG:  Not Detected                                          

           

  

 

                    Resp Syncytial Virus                   Not Detected         

                    

                    NEG:  Not Detected                                          

           

  

 

                    Bordetella pertussis                   Not Detected         

                    

                    NEG:  Not Detected                   46252-5                

   LOINC   

            

 

                    Chlamydophila pneumo                   Not Detected         

                    

                    NEG:  Not Detected                                          

           

  

 

                    Mycoplasma pneumonia                   Not Detected         

                    

                    NEG:  Not Detected                                          

           

  

 

                                                          

 

                                        TROPONIN-I ADV - Collect Date/Time:  10:45                 

 

                                        [facility]                 

 

                                        ID: [id number]  

               

 

                                        [address] [facility] [address], [place], [id number]

                 

 

                                        [facility]                 

 

                                        ID: [id number]

                 

 

                                        [address], [place], [place], [id number]

                 

 

                                        LOINC:                

 

                Test                   Value                   Unit             

      Reference 

Range                     Code                      Code System                

 

                    TROPONIN-I AD                   < 0.04                   ng/

mL                  

                    L=0.04     H=0.40                   82959-2                 

  [facility]             

  

 

                                                          

 

                                        COMPREHENSIVE METABOLIC PANEL - Collect 

Date/Time: 2018 08:16             

   

 

                                        [facility]                 

 

                                        ID: [id number]  

               

 

                                        [address], [place], [place], [phone]

                 

 

                                        [facility]                 

 

                                        ID: [id number]

                 

 

                                        [address] [facility] [address], [place], [place], [id number]

                 

 

                                        LOINC:                

 

                Test                   Value                   Unit             

      Reference 

Range                     Code                      Code System                

 

                GLUCOSE                   147                   MG/DL           

        L=70    

  H=100                   2345-7                    LOINC                

 

                SODIUM                   140                   MEQ/L            

       L=135    

 H=148                    2951-2                    LOINC                

 

                POTASSIUM                   3.8                   MEQ/L         

          L=3.5 

    H=5.3                   2823-3                    LOINC                

 

                CHLORIDE                   106                   MEQ/L          

         L=96   

   H=110                   2075-0                    LOINC                

 

                CO2                   22                   MEQ/L                

   L=22       

H=29                      2028-9                    LOINC                

 

                BUN                   16                   MG/DL                

   L=8        

H=22                      3094-0                    LOINC                

 

                CREATININE                   0.8                   MG/DL        

           L=0.6

     H=1.6                   2160-0                    LOINC                

 

                SGOT/AST                   27                   IU/L            

       L=10     

 H=40                     1920-8                    LOINC                

 

                SGPT/ALT                   30                   IU/L            

       L=8      

 H=54                     1742-6                    LOINC                

 

                ALK PHOS                   91                   IU/L            

       L=35     

 H=115                    6768-6                    LOINC                

 

                TOTAL PROTEIN                   7.5                   G/DL      

             

L=5.5      H=8.5                   2885-2                    LOINC              

 



 

                ALBUMIN                   4.1                   G/DL            

       L=3.1    

 H=5.4                    1751-7                    LOINC                

 

                TOTAL BILI                   0.6                   MG/DL        

           L=0.0

     H=1.5                   1975-2                    LOINC                

 

                CALCIUM                   9.6                   MG/DL           

        L=8.2   

  H=10.6                   38744-5                   LOINC                

 

                AGE                   53                   yrs                  

                

                                                                     

 

                GFR NonAA                   75                                  

                

                                                                     

 

                GFR AA                   91                                     

                

                                                                     

 

                eGFR                   75                   mL/min/1.7          

                

                                                                     

 

                eGFR AA*                   >60                                  

                

                                                                     

 

                                                          

 

                                        CBC W/ AUTO DIFF (RFLX MAN DIFF IF IND) 

- Collect Date/Time: 2018 08:16   

             

 

                                        [facility]  [facility]                 

 

                                        ID: [id number]

                 

 

                                        [address], [place], [place], [id number]

                 

 

                                        [facility]                 

 

                                        ID: [id number]  

               

 

                                        [address], [place], [place], [phone]

                 

 

                                        LOINC:                

 

                Test                   Value                   Unit             

      Reference 

Range                     Code                      Code System                

 

                WBC                   6.6                   TH/CMM              

     L=4.5      

H=10.8                    11470-3                   LOINC                

 

                RBC                   4.12                   ML/CMM             

      L=4.20    

H=5.40                    789-8                     LOINC                

 

                HGB                   11.8                   G/DL               

    L=12.0     

H=16.0                    718-7                     LOINC                

 

                HCT                   37.5                   %                  

 L=37.0     

H=47.0                    4544-3                    LOINC                

 

                MCV                   91                   FL                   

L=81       H=99 

                                                                     

 

                MCH                   28.6                   PG                 

  L=27.0     

H=33.0                                                               

 

                MCHC                   31.5                   G/DL              

     L=31.0     

H=36.0                                                               

 

                RDW SD                   48                   FL                

   L=36       

H=50                                                                 

 

                RDW CV                   14.4                   %               

    L=0.0      

H=14.8                                                               

 

                MPV                   11.2                   FL                 

  L=9.3      

H=12.5                                                               

 

                PLT                   243                   TH/CMM              

     L=130      

H=440                     777-3                     LOINC                

 

                NRBC#                   0.00                   TH/CMM           

        L=0.00  

  H=0.00                                                              

 

                NRBC%                   0.0                   /100WBC           

        L=0.0   

  H=2.0                                                              

 

                %NEUT                   82.3                   %                

                

                                                                     

 

                %LYMP                   13.9                   %                

                

                                                                     

 

                %MONO                   2.4                   %                 

                

                                                                     

 

                %EOS                   0.8                   %                  

                

                                                                     

 

                %BASO                   0.3                   %                 

                

                                                                     

 

                #NEUT                   5.41                   TH/CMM           

        L=2.10  

  H=8.20                                                              

 

                #LYMP                   0.91                   TH/CMM           

        L=0.90  

  H=5.20                                                              

 

                #MONO                   0.16                   TH/CMM           

        L=0.16  

  H=1.00                                                              

 

                #EOS                   0.05                   TH/CMM            

       L=0.00   

 H=0.80                                                              

 

                #BASO                   0.02                   TH/CMM           

        L=0.00  

  H=0.20                                                              

 

                MANUAL DIFF                   NOT IND                           

                

                                                                     

 

                                                          

 

                                        BNP - Collect Date/Time: 2018 08:1

6                 

 

                                        [facility]                 

 

                                        ID: [id number]  

               

 

                                        [address], [place], [place], [id number]

                 

 

                                        [facility]                 

 

                                        ID: [id number]

                 

 

                                        [address], [place], [place], [id number]

                 

 

                                        LOINC:                

 

                Test                   Value                   Unit             

      Reference 

Range                     Code                      Code System                

 

                BNP                   882                   PG/ML               

    L=0        

H=100                     71971-8                   LOINC                

 

                                                          

 

                                        PT/PTT - Collect Date/Time: 2018 0

8:16                 

 

                                        [facility]                 

 

                                        ID: [id number]  

               

 

                                        [address], [place], [place], [id number]

                 

 

                                        [facility]                 

 

                                        ID: [id number]

                 

 

                                        [address], [place], [place], [phone]

                 

 

                                        LOINC:                

 

                Test                   Value                   Unit             

      Reference 

Range                     Code                      Code System                

 

                PROTIME                   12.3                   SEC            

       L=9.4    

 H=12.5                   5964-2                    LOINC                

 

                INR                   1.0                                       

                

                          75123-7                   LOINC                

 

                PTT                   31.3                   SEC                

   L=25.1     

H=36.5                    3173-2                    LOINC                

 

                                                          

 

                                        TROPONIN-I ADV - Collect Date/Time:  08:16                 

 

                                        [facility]                 

 

                                        ID: [id number]  

               

 

                                        [address], [place], [place], [id number]

                 

 

                                        [facility]                 

 

                                        ID: [id number]

                 

 

                                        [address], [place], [place], [id number]

                 

 

                                        LOINC:                

 

                Test                   Value                   Unit             

      Reference 

Range                     Code                      Code System                

 

                    TROPONIN-I AD                   < 0.04                   ng/

mL                  

                    L=0.04     H=0.40                   20478-3                 

  LOINC             

  

 

                                                          

 

                                        CX CHEST 1 VIEW - Completed: 2018 

08:33                 

 

                                        LOINC:                

 

                                        EXAMINATION:CX CHEST 1 VIEWREASON FOR EX

AM:Chest Pain;Dyspnea; 

COMPARISON:2018FINDINGS:The heart is prominent size.  The pulmonary
vascularity is at the upper limits for normal.  No consolidating infiltrate, 
pleural effusion or pneumothorax is seen.  Coarse interstitial markings are 
seen.  There is mild centrilobular airspace disease.  No consolidation is noted.
 IMPRESSION:1.The heart size and pulmonary vascularity are mildly prominent. 
2.Some mild centrilobular airspace disease may represent pulmonary edema.  Mild 
pneumonitis cannot be ruled out. 3.No consolidation is seen. Reviewed and 
Electronically Signed by:  Demetrius Beal MD DABRSigned Date/Time: 2018 
9:45 AMJob ID#: [id number]                 

 

                                                          

 

                                        US ECHO 2D COMP WITH DOPP AND COLOR - Co

mpleted: 2018 19:21               

 

 

                                        LOINC:                

 

                                        [place]  

[address]---------------------------------------------------------------------------

--------------DIAGNOSTIC IMAGING 
REPORT--------------------------------------------------------
---------------------------------Name:  RITU HALE LPatient Number:  
[id number]:  212 2Stay Type:  O/PMedical Record Number:  [id number] Date:  
2018Discharge Date:  YOB: 1965Age:  53     Sex:  
FOrdering Physician:  LEELEE Gee Physician:  LEELEE Maravilla 
Physician:  MARVIN DegrootPTtracyphone Number:  [phone]-Bjs Number:  
[id number] Class:  POrder Number:  [id number] 
Location:Transcription Date/Time:  2018 09:37:08    Initials:  
VIOLET-----------------------------------------------------------------------------

------------**unsigned transcriptions are preliminary reports and do 
notrepresent a Medical or Legal 
document**----------------------------------------------------------------------

-------------------US ECHO 2D COMP WITH DOPP AND COLOR   [id number]   COMPLETE: 
2018 19:21:00 [id number] for ECHO:  
CHF-----------------------------------------------------------------------------

------------INDICATIONS FOR PROCEDURECHF.2-D AND M-MODE FINDINGSRhythm was 
regular. Technical quality was mildly limited. Patient with moderately severe 
global left ventricular hypokinesis present. Ejection fraction is estimated at 
approximately 25-30 percent. There is discoordinated septal motion. There is 
mild biatrial enlargement with left atrium of 4.6 cm in diameter. No marked 
valvular pathology was noted.DOPPLER FINDINGSThere is mild to moderate mitral 
and mild tricuspid insufficiency present. Right ventricle systolic pressure is 
mildly elevated at 40 mmHg.FINAL IMPRESSION1.GLOBAL LEFT VENTRICULAR HYPOKINESIS
WITH AN EJECTION FRACTION OF APPROXIMATELY 30 PERCENT. BORDERLINE LEFT 
VENTRICULAR HYPERTROPHY WAS PRESENT. 2.MILD LEFT ATRIAL ENLARGEMENT WITH MILD TO
MODERATE MITRAL INSUFFICIENCY, RIGHT ATRIUM UPPER LIMITS OF NORMAL WITH MILD 
TRICUSPID INSUFFICIENCY OR MILD PULMONARY HYPERTENSION.D:  2018 
16:49:26Job#[id number]                 



                                                                                
                                                                                
                                                                                
               



Social History

                      



                Type                   Status                   Start Date      

             End

Date                      Code                      Code System                

 

                    Smoking History                   Current every day smoker  

                    

                                        [id number]                   SNOMED-CT   

    

        



                                                                                
                 



Vital Signs

                      



                Vital Sign                   Value                   Unit       

            

Rains Value                   Rains Unit                   Date/Time      

                    Recent/Initial?                   Code                   Cod

e System

               

 

                    Body Mass Index                   25.40                   kg

/m2                 

                                                        12/15/2018 11:49        

           

Initial                   54921-3                   LOINC                

 

                    Systolic Blood Pressure                   113               

    mm[Hg]          

                                                            12/15/2018 07:51    

           

                    Most Recent                   8480-6                   LOINC

                

 

                    Diastolic Blood Pressure                   77               

    mm[Hg]          

                                                            12/15/2018 07:51    

           

                    Most Recent                   8462-4                   LOINC

                

 

                    Systolic Blood Pressure                   152               

    mm[Hg]          

                                                            2018 13:14    

           

                    Initial                   8480-6                   LOINC    

            

 

                    Diastolic Blood Pressure                   112              

     mm[Hg]         

                                                            2018 13:14    

          

                    Initial                   8462-4                   LOINC    

            

 

                Body Surface Area                   1.74                   m2   

                

                                        12/15/2018 11:49                   

Initial                   3140-1                    LOINC                

 

                Height                   162.5600                   cm          

         64.00  

                    in                   12/15/2018 11:49                   Init

ial

                          8302-2                    LOINC                

 

                O2 Saturation                   98                   %          

                

                                        12/15/2018 07:51                   Most 

Recent 

                          29694-3                   LOINC                

 

                O2 Saturation                   98                   %          

                

                                        2018 13:14                   Initi

al     

                          30606-5                   LOINC                

 

                Pulse                   88.0                   /min             

                

                                        12/15/2018 07:51                   Most 

Recent    

                          8867-4                    LOINC                

 

                Pulse                   104.0                   /min            

                

                                        2018 13:14                   Initi

al       

                          8867-4                    LOINC                

 

                Respiration                   18                   /min         

                

                                        12/15/2018 07:51                   Most 

Recent

                          9279-1                    LOINC                

 

                Respiration                   18                   /min         

                

                                        2018 13:14                   Initi

al    

                          9279-1                    LOINC                

 

                Temperature                   36.5                   Fabi        

           97.7 

                    F                   12/15/2018 07:51                   Most 

Recent                    8310-5                    LOINC                

 

                Temperature                   36.4                   Fabi        

           97.5 

                    F                   2018 13:14                   Initi

al

                          8310-5                    LOINC                

 

                Weight                   67.1316                   kg           

        148.00  

                    lbs                   12/15/2018 11:49                   

Initial                   21422-7                   LOINC                



                                                                              



Assessment

                      



                                                            



You had the following problems:                                          CHEST 
PAIN                      PULMONARY EDEMA                      HYPERTENSION     
                                                 



                                                                    



Hospital Discharge Instructions

                      



                                                            



Should you have any questions prior to discharge, please contact a member of 
your healthcare team. If you have left the hospital and have any questions, 
please contact your primary care physician.                    



PRIMARY CARE PROVIDER:                                          Emmy Roble,
 [phone]                                        



HOME MEDICATION INSTRUCTIONS:                                          Take only
 the medications listed above..                                        



HOME DIET:                                          Cardiac diet as tolerated   
                                     



ACTIVITY INSTRUCTIONS(list limitations):                                        
  Activity as Tolerated.                                        



HEART FAILURE PATIENT:                                          Weight 
monitoring daily, Call MD if wt gain >3#/1 day or 5#/3 day.                     
                   



SMOKING CESSATION:                                          Smoking and second 
hand smoke is harmful, to your health..                      Smoking has been 
linked to cancer, cardiac disease, COPD, and asthma..                      For 
more information you can call:, 5-876-CFF-STOP, or [employer]..             
         A pamphlet on smoking was given to you, at admission..                 
                       



FOLLOW UP APPOINTMENT:                                          Follow up with 
Emmy Zhang in 1 week.                      Follow up with your cardiologist
 in 2 weeks.                      You will need to call and make these 
appointments on Monday.                                        



CONTACT PHYSICIAN IF YOU EXPERIENCE ANY:                                        
  fluid overload, lower extremity swelling, dehydration, chest pain.            
                            



PERSONAL ITEMS RETURNED:                                          N/A.          
                              



INSTRUCTIONS GIVEN AND DISCHARGE TO:                                          
Instructions provided to:, Patient, Discharged to:__home__  With:_daughter      
                                  



INSTRUCTIONS GIVEN BY (TYPE IN NAME AND DATE)                                   
       MEERA Abbott                                        



SCRIPTS WRITTEN BY DOCTOR GIVEN TO PATIENT?                                     
     scripts sent electronically                                        



HOME MEDS RETURNED TO PATIENT:                                          Advair 
Diskus 500/50                                                      



                                                                    



Reason For Referral

                      



                                                         

 

                                        No Data Found                



                                                                    



Hospital Course

                      



                                                            



You were admitted to        [facility] on 2018 12:50 with a principal 
diagnosis of Chest pain, unspecified                    



You were discharged from        [facility] on 12/15/2018 11:55              
                    



                                                                                
        



Medications

                      



                    Medication                   Start Date                   En

d Date              

                Route                   Frequency                   Dose        

           

Code                                    Code System                

 

                          Atorvastatin Calcium 40MG Oral Tablet                 

  12/15/2018              

                    Unknown                   BY MOUTH                   DAILY  

               

                    1 TABLET                   [id number]                   RxNorm  

              

 

                          ProAir HFA 0.09MG/1 INH Inhalation Aerosol Powder     

              12/15/2018  

                    Unknown                   INHALATION                   AS 

NEEDED EVERY 4 HR                   2 PUFF                    [id number]            

 

                                        RxNorm                

 

                    Doxycycline 100MG Oral Capsule                   12/15/2018 

                  

Unknown                   BY MOUTH                   TWO TIMES A DAY            

                    1                   [id number]                   RxNorm        

        

 

                    Lasix 20MG Oral Tablet                   12/15/2018         

          Unknown   

                    BY MOUTH                   DAILY                   1 TABLET 

    

                                              RxNorm                

 

                    predniSONE 50MG Oral Tablet                   12/15/2018    

               

Unknown                   BY MOUTH                                       1 

TABLET                    [id number]                    RxNorm                

 

                          Nitrostat 0.4MG Sublingual Tablet                   12

/15/2018                  

                    Unknown                   BUCCAL MUCOSA                   AS

 NEEDED EVERY 8 HR 

                    1 TABLET                   [id number]                   RxNorm  

  

            

 

                    Lisinopril 20MG Oral Tablet                   12/15/2018    

               

Unknown                   BY MOUTH                   DAILY                   20 

MILLIGRAMS                   [id number]                    RxNorm                

 

                    Carvedilol 6.25MG Oral Tablet                   12/15/2018  

                 

Unknown                   BY MOUTH                   TWO TIMES A DAY            

                    6.25 MILLIGRAMS                   [id number]                   R

xNorm        

        

 

                          Aspirin 81MG Oral Tablet, Chewable                   1

2/15/2018                 

                    Unknown                   BY MOUTH                   DAILY W

ITH A MEAL        

                    81 MILLIGRAMS                   [id number]                   RxN

orm      

          

 

                          Advair Diskus 500/50 0.5MG-0.05MG/Actuati Inhalation D

isk                   

12/15/2018                   Unknown                   INHALATION               

                    BID (RT ONLY)                   1 PUFF                   896

229             

                                        RxNorm                



                                                                                
                                                                                
        



Procedures

                      



                    Procedure Name                   Date                   Stat

us                  

                          Code                      Code System                

 

                    Biopsy of breast                                       compl

eted                

                          [id number]                   SNOMED CT                



                                                                                
        



Implants

                      



                                                         

 

                                        No Data Found                



                                                                              



Problems

                      



                    Problem                   Start Date                   Resol

jael Date            

                    Status                   Code                   Code System 

             

  

 

                CHEST PAIN                                                      

     active     

                          21480613                   SNOMED-CT                

 

                PULMONARY EDEMA                                                 

          active

                          [id number]                   SNOMED-CT                

 

                HYPERTENSION                                                    

       active   

                          16521952                   SNOMED-CT                

 

                    DIVERTICULITIS                                       

018                 

                    resolved                   [id number]                   SNOME

D-CT              

  



                                                                                
                                      



Allergies

                      



                    Allergy Substance                   Reaction                

   Severity         

                    Start Date                   Concern Status                 

  Code    

                                        Code System                

 

                    No Known Drug Allergies                                     

                    

                                    Active                                      

 RxNorm       

         



                                                                              



Plan of Treatment

                      



                                                         

 

                                        No Data Found                



                                                                    



Encounters

                      



                                                         

 

                                        No Data Found                



                                                                    



Goals

                      



                                                         

 

                                        No Data Found                



                                                                    



Discharge Medications

                      



                                                         

 

                                        No Data Found                



                                                                                
                    



Discharge Diagnosis

                      



                    Discharge Diagnosis                   Diagnosis Code        

           Start 

Date                

 

                    Chest pain, unspecified                   R079              

     2018     

           



                                                                              



Health Concerns Section

                      



                                                         

 

                                        No Data Found

## 2020-05-02 NOTE — DIAGNOSTIC IMAGING REPORT
EXAM: Portable erect AP chest at 2:15



INDICATION: Swelling throughout body



There are no prior studies available for comparison.



The heart size is within normal limits. There is a defibrillator

device in place on the left. The leads seem to be in good

position.



There is elevation of the right hemidiaphragm. I suspect this

finding is long-standing in nature. If previous exams are

available, they would be helpful for comparison.



The lungs, where visualized, are generally clear. There are a few

carotid bronchovascular markings in the right infrahilar region

but there is no clear evidence for pneumonia in this area. The

mediastinum is not widened. The osseous structures are intact. 



IMPRESSION:

1. There are a few carotid bronchovascular markings in the right

infrahilar region but there is no clear evidence for pneumonia.

However if clinical concern regarding an underlying abnormality

persists, a follow-up PA and lateral chest would be recommended

for further study. 



Dictated by: 



  Dictated on workstation # [id number]

## 2020-05-02 NOTE — XMS REPORT
[facility]

                             Created on: 10/02/2017



Aide Huang

External Reference #: [id number]

: 1965

Sex: Female



Demographics





                          Address                   [id number] [place]  [address]

 

                          Preferred Language        Unknown

 

                          Marital Status            Unknown

 

                          [employer] Affiliation     Unknown

 

                          Race                      Unknown

 

                          Ethnic Group              Unknown





Author





                          Author                    Aide HUSTON

 

                          Organization              [facility]

 

                          Address                   [address] [place]  [address]



 

                          Phone                     [phone]







Care Team Providers





                    Care Team Member Name Role                Phone

 

                    UJDY HUSTON   Unavailable         [phone]







PROBLEMS





          Type      Condition ICD9-CM Code BPT65-TP Code Onset Dates Condition S

tatus SNOMED 

Code

 

          Problem   Primary osteoarthritis of both knees           M17.0        

       Active    [id number]

 

                Problem         Gastroesophageal reflux disease, esophagitis pre

sence not specified                 

K21.9                                   Active              [id number]

 

          Problem   Essential hypertension           I10                 Active 

   [id number]







ALLERGIES

No Information



SOCIAL HISTORY

Never Assessed



PLAN OF CARE





VITAL SIGNS





MEDICATIONS

No Known Medications



RESULTS

No Results



PROCEDURES

No Known procedures



IMMUNIZATIONS

No Known Immunizations



MEDICAL (GENERAL) HISTORY





                    Type                Description         Date

 

                    Medical History     hypertension         

 

                    Medical History     Fluid on knees       

 

                    Surgical History    knee surgery Lt X 2  

 

                    Surgical History    tubal ligation       

 

                    Surgical History    lump removed Lt breast  

 

                    Surgical History    ovary removed Lt     

 

                    Surgical History    tonsillectomy and adenoidectomy  

 

                    Surgical History    tubes put in ears    

 

                    Surgical History    colonoscopy          

 

                    Surgical History    heart cath          2017

 

                    Hospitalization History surgeries            

 

                    Hospitalization History child birth

## 2020-05-02 NOTE — XMS REPORT
[facility]

                             Created on: 2018



Aide Huang

External Reference #: [id number]

: 1965

Sex: Female



Demographics





                          Address                   [address]

[place]  [address]

 

                          Preferred Language        Unknown

 

                          Marital Status            Unknown

 

                          Taoism Affiliation     Unknown

 

                          Race                      Unknown

 

                          Ethnic Group              Unknown





Author





                          Author                    Aide HUSTON

 

                          Organization              [facility]"[employer]"

 

                          Address                   [address] [place]  [address]



 

                          Phone                     [phone]







Care Team Providers





                    Care Team Member Name Role                Phone

 

                    JUDY HUSTON   Unavailable         [phone]







PROBLEMS





          Type      Condition ICD9-CM Code YLZ58-PM Code Onset Dates Condition S

tatus SNOMED 

Code

 

          Problem   Primary osteoarthritis of both knees           M17.0        

       Active    228305349

 

                Problem         Gastroesophageal reflux disease, esophagitis pre

sence not specified                 

K21.9                                   Active              [id number]

 

          Problem   Essential hypertension           I10                 Active 

   [id number]







ALLERGIES

No Known Allergies



ENCOUNTERS





                Encounter       Location        Date            Diagnosis

 

                    [facility]      [id number] [place] [address]                                     Weight gain R63.5

 

                    [facility]      [id number] [place] [address] 26 Sep, 

2017                                    Essential hypertension I10 ; Primary ost

eoarthritis of both knees M17.0 ; 

Gastroesophageal reflux disease, esophagitis presence not specified K21.9 and 
Encounter for immunization Z23

 

                    [facility]      [address] [id number] [place] [address]                                     Gastroesophageal reflux disease, esophag

itis presence not specified K21.9

 

                    [facility]      [id number] [place] [address] 14 2017                                    Essential hypertension I10 and Gastroeso

phageal reflux disease, esophagitis

presence not specified K21.9

 

                    [facility]      [id number] [place] [address]                                      

 

                    [facility]      [id number] [place] [address] 16 May, 

2017                                     

 

                    [facility]      [id number] [place] [address] 16 May, 

2017                                     

 

                    [facility]      [id number] [place] [address] 14 Mar, 

2017                                    Essential hypertension I10

 

                    [facility]       [id number] [place] [address] 09 Mar, 

2017                                    Essential hypertension I10

 

                    [facility]       [id number] [place] [address] 07 Mar, 

2017                                     

 

                    [facility] [id number] [place] [address]                                     Essential hypertension I10 and Shortness

 of breath R06.02

 

                    [facility]      [facility] [id number] [place] [address]                                     Essential hypertension I10 and Shortness

 of breath R06.02

 

                    [facility]      [facility] [id number] [place] [address]                                     Shortness of breath R06.02 ; Essential h

ypertension I10 and Tobacco 

dependence F17.200

 

                    [facility]      [address] [place] [address] [id number] [place] [address]                                     Essential hypertension I10 and Shortness

 of breath R06.02







IMMUNIZATIONS

No Known Immunizations



SOCIAL HISTORY

Never Assessed



REASON FOR VISIT

would like to talk about diet plan steriod inhaler is putting on weight. period 
irregular this month. Kate rendon



PLAN OF CARE





                          Activity                  Details

 

                                         

 

                          Follow Up                 prn Reason:







VITAL SIGNS





                    Height              65 in               2017

 

                    Weight              168.5 lbs           2017

 

                    Temperature         98.1 degrees Fahrenheit 2017

 

                    Heart Rate          88 bpm              2017

 

                    Respiratory Rate    18                  2017

 

                    BMI                 28.04 kg/m2         2017

 

                    Blood pressure systolic 130 mmHg            2017

 

                    Blood pressure diastolic 60 mmHg             2017







MEDICATIONS





        Medication Instructions Dosage  Frequency Start Date End Date Duration S

tatus

 

          Pantoprazole Sodium 40 mg Orally Once a day 1 tablet  24h       26 Sep

, 2017           30 

day(s)                                  Active

 

        Excedrin Migraine 250-250-65 MG Orally PRN 3 tabs                       

           Active

 

        Metoprolol Tartrate 25 MG Orally Twice a day 1 tablet with food 12h     

                30 days 

Active

 

        Amlodipine Besylate 5 mg Orally Once a day in AM 1 tablet               

          30 days Active

 

             Diclofenac Sodium 75 MG Orally Twice a day 1 tablet with food or mi

lk 12h          26 

Sep, 2017           25 Dec, 2017        30 day(s)           Active

 

        Combivent Respimat  MCG/ACT Inhalation Four times a day 1 puff  6h

                              

Active







RESULTS

No Results



PROCEDURES

No Known procedures



INSTRUCTIONS





MEDICATIONS ADMINISTERED

No Known Medications



MEDICAL (GENERAL) HISTORY





                    Type                Description         Date

 

                    Medical History     hypertension         

 

                    Medical History     Fluid on knees       

 

                    Surgical History    knee surgery Lt X 2  

 

                    Surgical History    tubal ligation       

 

                    Surgical History    lump removed Lt breast  

 

                    Surgical History    ovary removed Lt     

 

                    Surgical History    tonsillectomy and adenoidectomy  

 

                    Surgical History    tubes put in ears    

 

                    Surgical History    colonoscopy          

 

                    Surgical History    heart cath          2017

 

                    Hospitalization History surgeries            

 

                    Hospitalization History child birth

## 2020-05-02 NOTE — XMS REPORT
Patient Summary (HL7 CCD)

                             Created on: 2017



GEOFFREY CHANEY

External Reference #: [id number]

: 1965

Sex: Female



Demographics





                          Address                   [address]

[place]  [address]

 

                          Home Phone                [phone]

 

                          Preferred Language        Unknown

 

                          Marital Status            Unknown

 

                          Congregational Affiliation     Unknown

 

                          Race                      White

 

                          Ethnic Group              Not  or 





Author





                          Author                    GEOFFREY HERNANDEZ

 

                          Organization              Unknown

 

                          Address                   [address]

[place]  [address]



 

                          Phone                     [phone]







Care Team Providers





                    Care Team Member Name Role                Phone

 

                    NEWELL, LULU DO     Attphys             [phone]

 

                    NEWELLRODRIGUEZLULU DO     Prisurg             [phone]



                                                                



Allergies

          



             Allergy       Code         Allergy Type       Reaction       Status

    

 

             No Known Drug Allergies       0            Drug allergy            

        Active    



                                                                                
       



Active Medications

          



          Medication       Code       Dose       Units       Frequency       Rou

te       

Modification                            Start Date/Time    

 

             Flagyl 500MG Oral Tablet       [id number]       500          TYRA

GRAMS       

THREE TIMES A DAY       ORAL                                    2014 13:40

    

 

                          Prescription Detail        500 MILLIGRAMS ORAL THREE T

IMES A DAY      

 

             Levaquin 750MG Oral Tablet       [id number]       750          MILLIGRA

MS       DAILY  

                    ORAL                                    2014 13:40    

 

                          Prescription Detail        750 MILLIGRAMS ORAL DAILY  

    

 

             Norco 325MG-5MG Oral Tablet       [id number]       1            MILLIGR

AMS       AS 

NEEDED              ORAL                                    2014 13:40    

 

                          Prescription Detail        1 MILLIGRAMS ORAL AS NEEDED

      



                                                                                
                           



Problems

          



             Problem       Code         Start Date       Resolved Date       Sta

tus    

 

             Diverticulitis       767121181                                 Acti

ve    



                                                                                
       



Procedures

          Unknown or Not Available.                                             
                                



Results

          Unknown or Not Available.                                             
                                



Encounters

          



                    Encounter Diagnosis       Diagnosis Code       Start Date   

 

 

                    Strain of tendon of neck        102177072           10/06/20

16    



                                                                                
       



Function Status

          Unknown or Not Available.                                             
                                



History of Immunizations

          Unknown or Not Available.                                             
                      



Social History

          



                Smoking Status       Code            Start Date       End Date  

  

 

                Current every day smoker       062324339                        

    



                                                                                
       



Vital Signs

          Unknown or Not Available.                                             
                      



Function Status

          Unknown or Not Available.                                             
  



Goals

          Unknown or Not Available.                                             
            



ASSESSMENTS

          Unknown or Not Available.                                             
                      



Health Concerns Section

          Unknown or Not Available.

## 2020-05-02 NOTE — XMS REPORT
[facility]

                             Created on: 2017



Aide Huang

External Reference #: [id number]

: 1965

Sex: Female



Demographics





                          Address                   [address] [place]  [address]

 

                          Preferred Language        Unknown

 

                          Marital Status            Unknown

 

                          [employer]ist Affiliation     Unknown

 

                          Race                      Unknown

 

                          Ethnic Group              Unknown





Author





                          Author                    Aide HUSTON

 

                          Organization              [facility]

 

                          Address                   [address] [place]  [address]



 

                          Phone                     [phone]







Care Team Providers





                    Care Team Member Name Role                Phone

 

                    JUDY HUSTON   Unavailable         [phone]







PROBLEMS





          Type      Condition ICD9-CM Code RME09-YK Code Onset Dates Condition S

tatus SNOMED 

Code

 

          Problem   Primary osteoarthritis of both knees           M17.0        

       Active    [id number]

 

                Problem         Gastroesophageal reflux disease, esophagitis pre

sence not specified                 

K21.9                                   Active              283355663

 

          Problem   Essential hypertension           I10                 Active 

   [id number]







ALLERGIES

No Information



SOCIAL HISTORY

Never Assessed



PLAN OF CARE





VITAL SIGNS





MEDICATIONS

No Known Medications



RESULTS

No Results



PROCEDURES

No Known procedures



IMMUNIZATIONS

No Known Immunizations



MEDICAL (GENERAL) HISTORY





                    Type                Description         Date

 

                    Medical History     hypertension         

 

                    Medical History     Fluid on knees       

 

                    Surgical History    knee surgery Lt X 2  

 

                    Surgical History    tubal ligation       

 

                    Surgical History    lump removed Lt breast  

 

                    Surgical History    ovary removed Lt     

 

                    Surgical History    tonsillectomy and adenoidectomy  

 

                    Surgical History    tubes put in ears    

 

                    Surgical History    colonoscopy          

 

                    Surgical History    heart cath          2017

 

                    Hospitalization History surgeries            

 

                    Hospitalization History child birth

## 2020-05-02 NOTE — XMS REPORT
[facility]

                             Created on: 2017



Aide Huang

External Reference #: [id number]

: 1965

Sex: Female



Demographics





                          Address                   [address] [place]  [address]

 

                          Preferred Language        Unknown

 

                          Marital Status            Unknown

 

                          Anabaptist Affiliation     Unknown

 

                          Race                      Unknown

 

                          Ethnic Group              Unknown





Author





                          Author                    Aide MENDEZ

 

                          Organization              [facility]

 

                          Address                   [address] [place]  [address]



 

                          Phone                     [phone]







Care Team Providers





                    Care Team Member Name Role                Phone

 

                    YOHANA MENDEZ Unavailable         [phone]







PROBLEMS





          Type      Condition ICD9-CM Code NKN52-TK Code Onset Dates Condition S

tatus SNOMED 

Code

 

                Problem         Gastroesophageal reflux disease, esophagitis pre

sence not specified                 

K21.9                                   Active              [id number]

 

          Problem   Essential hypertension           I10                 Active 

   [id number]







ALLERGIES





             Substance    Reaction     Event Type   Date         Status

 

             N.K.D.A.     Unknown      Non Drug Allergy  Unknown







SOCIAL HISTORY

No smoking Hx information available



PLAN OF CARE





                          Activity                  Details

 

                                         

 

                          Follow Up                 states has appt in 2 days wi

kristina Zhang to [facility] care Reason:







VITAL SIGNS





                    Height              65 in               2017

 

                    Weight              165.5 lbs           2017

 

                    Temperature         98.0 degrees Fahrenheit 2017

 

                    Heart Rate          84 bpm              2017

 

                    Respiratory Rate    20                  2017

 

                    Oximetry            99 %                2017

 

                    BMI                 27.54 kg/m2         2017

 

                    Blood pressure systolic 160 mmHg            2017

 

                    Blood pressure diastolic 98 mmHg             2017







MEDICATIONS





        Medication Instructions Dosage  Frequency Start Date End Date Duration S

tatus

 

        Excedrin Migraine 250-250-65 MG Orally Once a day 3 tabs  24h           

                  Active

 

             Metoprolol Tartrate 25 MG Orally Twice a day 1 tablet with food 12h

          

                                        30 day(s)           Active

 

                          Symbicort 160-4.5 MCG/ACT Inhalation Twice a day- rins

e mouth and spit after use

           2 puffs                                      Active

 

          Amlodipine Besylate 5 mg Orally Once a day in AM 1 tablet            0

           30 

day(s)                                  Active







RESULTS

No Results



PROCEDURES





                Procedure       Date Ordered    Related Diagnosis Body Site

 

                MEASURE BLOOD OXYGEN LEVEL 2017                     

 

                Office Visit, Est Pt., Level 3 2017                     







IMMUNIZATIONS

No Known Immunizations

## 2021-06-18 ENCOUNTER — HOSPITAL ENCOUNTER (OUTPATIENT)
Dept: HOSPITAL 75 - RAD | Age: 56
End: 2021-06-18
Attending: NURSE PRACTITIONER
Payer: MEDICARE

## 2021-06-18 DIAGNOSIS — N20.0: Primary | ICD-10-CM

## 2021-06-18 DIAGNOSIS — I10: ICD-10-CM

## 2021-06-18 LAB
ALBUMIN SERPL-MCNC: 4.5 GM/DL (ref 3.2–4.5)
ALP SERPL-CCNC: 89 U/L (ref 40–136)
ALT SERPL-CCNC: 27 U/L (ref 0–55)
BILIRUB SERPL-MCNC: 0.4 MG/DL (ref 0.1–1)
BUN/CREAT SERPL: 17
CALCIUM SERPL-MCNC: 9.7 MG/DL (ref 8.5–10.1)
CHLORIDE SERPL-SCNC: 100 MMOL/L (ref 98–107)
CO2 SERPL-SCNC: 24 MMOL/L (ref 21–32)
CREAT SERPL-MCNC: 0.95 MG/DL (ref 0.6–1.3)
GFR SERPLBLD BASED ON 1.73 SQ M-ARVRAT: > 60 ML/MIN
GLUCOSE SERPL-MCNC: 119 MG/DL (ref 70–105)
POTASSIUM SERPL-SCNC: 4.6 MMOL/L (ref 3.6–5)
PROT SERPL-MCNC: 7.7 GM/DL (ref 6.4–8.2)
SODIUM SERPL-SCNC: 136 MMOL/L (ref 135–145)

## 2021-06-18 PROCEDURE — 36415 COLL VENOUS BLD VENIPUNCTURE: CPT

## 2021-06-18 PROCEDURE — 80053 COMPREHEN METABOLIC PANEL: CPT

## 2021-06-18 PROCEDURE — 74160 CT ABDOMEN W/CONTRAST: CPT

## 2021-06-18 NOTE — DIAGNOSTIC IMAGING REPORT
PROCEDURE: CT abdomen with contrast only.



TECHNIQUE: Multiple contiguous axial images were obtained through

the abdomen after the administration of intravenous contrast.

Auto Exposure Controls were utilized during the CT exam to meet

ALARA standards for radiation dose reduction. 



Date: June 18, 2021.



Indication: 56-year-old female, right upper quadrant abdominal

pain.



Comparison: None.



Findings:



The visualized portions of the lung bases are clear. The heart is

not enlarged. There is no pericardial effusion.



The liver is unremarkable in size and contour. There is no

identified liver lesion. The main, right, and left portal veins

are patent. The gallbladder is unremarkable. There is no

intrahepatic or extrahepatic bile duct dilation. The main

pancreatic duct is not abnormally dilated. Unremarkable

appearance of the pancreatic parenchyma. The spleen is normal in

size. The adrenal glands are unremarkable.



There are nonobstructing right renal stones. There is no

hydronephrosis.



The visualized portions of the intestinal tract are not

distended. There is no free intraperineal air. There is no

drainable fluid collection. There is no ascites.



There are atherosclerotic calcifications. There is no identified

abnormally enlarged lymph node in the abdomen meeting size

criteria for adenopathy.



There is no identified acute bony abnormality.



Impression:

1. Nonobstructing right renal stones. No hydronephrosis.

2. No identified acute abnormality in the abdomen.



Dictated by: 



  Dictated on workstation # [id number]